# Patient Record
Sex: FEMALE | Race: BLACK OR AFRICAN AMERICAN | NOT HISPANIC OR LATINO | Employment: FULL TIME | URBAN - METROPOLITAN AREA
[De-identification: names, ages, dates, MRNs, and addresses within clinical notes are randomized per-mention and may not be internally consistent; named-entity substitution may affect disease eponyms.]

---

## 2017-02-06 LAB
INFLUENZA A ANTIGEN, POC: POSITIVE
INFLUENZA B ANTIGEN, POC: NEGATIVE

## 2017-02-06 PROCEDURE — 99284 EMERGENCY DEPT VISIT MOD MDM: CPT | Mod: 25

## 2017-02-06 PROCEDURE — 87804 INFLUENZA ASSAY W/OPTIC: CPT

## 2017-02-07 ENCOUNTER — HOSPITAL ENCOUNTER (EMERGENCY)
Facility: OTHER | Age: 32
Discharge: HOME | End: 2017-02-07
Attending: EMERGENCY MEDICINE
Payer: MEDICAID

## 2017-02-07 VITALS
WEIGHT: 260 LBS | TEMPERATURE: 99 F | SYSTOLIC BLOOD PRESSURE: 135 MMHG | OXYGEN SATURATION: 98 % | RESPIRATION RATE: 18 BRPM | HEART RATE: 85 BPM | DIASTOLIC BLOOD PRESSURE: 84 MMHG | BODY MASS INDEX: 43.32 KG/M2 | HEIGHT: 65 IN

## 2017-02-07 DIAGNOSIS — J10.1 INFLUENZA A: Primary | ICD-10-CM

## 2017-02-07 PROCEDURE — 25000003 PHARM REV CODE 250: Performed by: EMERGENCY MEDICINE

## 2017-02-07 RX ORDER — OSELTAMIVIR PHOSPHATE 75 MG/1
75 CAPSULE ORAL 2 TIMES DAILY
Qty: 10 CAPSULE | Refills: 0 | Status: SHIPPED | OUTPATIENT
Start: 2017-02-07 | End: 2017-02-12

## 2017-02-07 RX ORDER — IBUPROFEN 800 MG/1
800 TABLET ORAL EVERY 6 HOURS PRN
Qty: 20 TABLET | Refills: 0 | Status: SHIPPED | OUTPATIENT
Start: 2017-02-07 | End: 2018-06-22

## 2017-02-07 RX ORDER — CODEINE PHOSPHATE AND GUAIFENESIN 10; 100 MG/5ML; MG/5ML
5 SOLUTION ORAL EVERY 4 HOURS PRN
Qty: 473 ML | Refills: 0 | Status: SHIPPED | OUTPATIENT
Start: 2017-02-07 | End: 2017-02-17

## 2017-02-07 RX ORDER — IBUPROFEN 400 MG/1
800 TABLET ORAL
Status: COMPLETED | OUTPATIENT
Start: 2017-02-07 | End: 2017-02-07

## 2017-02-07 RX ADMIN — IBUPROFEN 800 MG: 400 TABLET ORAL at 12:02

## 2017-02-07 NOTE — DISCHARGE INSTRUCTIONS
Influenza     Viruses that cause influenza spread through the air in droplets when someone who has the flu coughs, sneezes, laughs, or talks.   Influenza (the flu) is an infection that affects your respiratory tract. This tract is made up of your mouth, nose, and lungs, and the passages between them. Unlike a cold, the flu can make you very ill. And it can lead to pneumonia, a serious lung infection. The flu can have serious complications and even be fatal for some people. These include older adults, young children, and people with certain chronic conditions.  Who is at risk for the flu?  Anyone can get the flu. But you are more likely to become infected if you:  · Have a weakened immune system  · Work in a healthcare setting where you may be exposed to flu germs  · Live or work with someone who has the flu  · Havent had an annual flu shot  How does the flu spread?  The flu is caused by viruses. The viruses spread through the air in droplets when someone who has the flu coughs, sneezes, laughs, or talks. You can become infected when you inhale these viruses directly. You can also become infected when you touch a surface on which the droplets have landed and then transfer the germs to your eyes, nose, or mouth. Touching used tissues, or sharing utensils, drinking glasses, or a toothbrush with an infected person can expose you to flu viruses, too.  What are the symptoms of the flu?  Flu symptoms tend to come on quickly and may last a few days to a few weeks. They include:  · Fever usually higher than 100.4°F  (38°C) and chills  · Sore throat and headache  · Dry cough  · Runny nose  · Tiredness and weakness  · Muscle aches  Things that make the flu worse  For some people, the flu can be very serious. The risk for complications is greater for:  · Children younger than age 5  · Adults ages 65 and older  · People with a chronic illness such as diabetes or heart, kidney, or lung disease  · People who live in a nursing  home or long-term care facility   How is the flu treated?  The flu usually gets better after 7 days or so. In some cases, your healthcare provider may prescribe an antiviral medicine. This may help you get well sooner. For the medicine to help, you need to take it as soon as possible (ideally within 48 hours) after your symptoms start. If you develop pneumonia or other serious illness, you may need to stay in the hospital.  Easing flu symptoms  · Drink lots of fluids such as water, juice, and warm soup. A good rule is to drink enough so that you urinate your normal amount.  · Get plenty of rest.  · Ask your healthcare provider what to take for fever and pain.  · Call your provider if your fever is 100.4°F (38°C) or higher, or you become dizzy, lightheaded, or short of breath.  Taking steps to protect others  · Wash your hands often, especially after coughing or sneezing. Or clean your hands with an alcohol-based hand  containing at least 60% alcohol.  · Cough or sneeze into a tissue. Then throw the tissue away and wash your hands. If you dont have a tissue, cough and sneeze into the crook of your elbow.  · Stay home until at least 24 hours after you no longer have a fever or chills. Be sure the fever isnt being hidden by fever-reducing medicine.  · Dont share food, utensils, drinking glasses, or a toothbrush with others.  · Ask your healthcare provider if others in your household should get antiviral medicine to help them avoid infection.  How can the flu be prevented?  · One of the best ways to avoid the flu is to get a flu vaccine each year. Viruses that cause the flu change from year to year. For that reason, doctors recommend getting the flu vaccine each year, as soon as it's available in your area. The vaccine may be given as a shot or as a nasal spray. Your healthcare provider can tell you which vaccine is right for you. The nasal spray is not recommended for the 0081-8025 flu season. The CDC says  this is because the nasal spray did not seem to protect against the flu over the last several flu seasons. In the past, it was meant for people ages 2 to 49.  · Wash your hands often. Frequent handwashing is a proven way to help prevent infection.  · Carry an alcohol-based hand gel containing at least 60% alcohol. Use it when you can't use soap and water. Then wash your hands as soon as you can.  · Avoid touching your eyes, nose, and mouth.  · At home and work, clean phones, computer keyboards, and toys often with disinfectant wipes.  · If possible, avoid close contact with others who have the flu or symptoms of the flu.  Handwashing tips  Handwashing is one of the best ways to prevent many common infections. If you are caring for or visiting someone with the flu, wash your hands each time you enter and leave the room. Follow these steps:  · Use warm water and plenty of soap. Rub your hands together well.  · Clean the whole hand, under your nails, between your fingers, and up the wrists.  · Wash for at least 15 seconds.  · Rinse, letting the water run down your fingers, not up your wrists.  · Dry your hands well. Use a paper towel to turn off the faucet and open the door.  Using alcohol-based hand   Alcohol-based hand  are also a good choice. Use them when you can't use soap and water. Follow these steps:  · Squeeze about a tablespoon of gel into the palm of one hand.  · Rub your hands together briskly, cleaning the backs of your hands, the palms, between your fingers, and up the wrists.  · Rub until the gel is gone and your hands are completely dry.  Preventing influenza in healthcare settings  The flu is a special concern for people in hospitals and long-term care facilities. To help prevent the spread of flu, many hospitals and nursing homes take these steps:  · Healthcare providers wash their hands or use an alcohol-based hand  before and after treating each patient.  · People with the flu  have private rooms and bathrooms or share a room with someone with the same infection.  · People at high-risk for the flu but don't have it are encouraged to get the flu and pneumonia vaccines.  · All healthcare workers are encouraged or required to get flu shots.   Date Last Reviewed: 8/27/2014  © 2099-0577 [employer]. [address] [place] [address], [place] [address]. All rights reserved. This information is not intended as a substitute for professional medical care. Always follow your healthcare professional's instructions.

## 2017-02-07 NOTE — ED PROVIDER NOTES
Encounter Date: 2017       History     Chief Complaint   Patient presents with    Generalized Body Aches     Pt has body aches, URI S&S x 1 day. no fever.      Review of patient's allergies indicates:  No Known Allergies  The history is provided by the patient.   31-year-old who complains of flulike symptoms.  Patient symptoms began 2 days ago.  Patient has been taking DayQuil for the symptoms with some improvement.  She reports generalized body aches which she rates as 7 out of 10.  She has also had a sore throat, nasal congestion and a cough productive of yellow sputum.  She denies shortness of breath or chest pain.  She reports headache.  Patient's daughter is ill with similar symptoms.  Patient is smoker   History reviewed. No pertinent past medical history.  No past medical history pertinent negatives.  Past Surgical History   Procedure Laterality Date     section, classic       x1     History reviewed. No pertinent family history.  Social History   Substance Use Topics    Smoking status: Never Smoker    Smokeless tobacco: None    Alcohol use No     Review of Systems   Constitutional: Negative for fever.   HENT: Positive for congestion and sore throat.    Eyes: Negative for pain.   Respiratory: Positive for cough. Negative for shortness of breath.    Cardiovascular: Negative for chest pain.   Gastrointestinal: Negative for abdominal pain.   Genitourinary: Negative for dysuria.   Musculoskeletal: Positive for back pain and myalgias. Negative for neck stiffness.   Skin: Negative for rash.   Neurological: Positive for headaches.       Physical Exam   Initial Vitals   BP Pulse Resp Temp SpO2   17 2252 17 2252 17 2252 17 2252 17 2252   138/81 92 20 98.6 °F (37 °C) 98 %     Physical Exam    Nursing note and vitals reviewed.  Constitutional: She appears well-developed and well-nourished.   HENT:   Head: Normocephalic and atraumatic.   Nose: Mucosal edema present.    Mouth/Throat: No oropharyngeal exudate.   Eyes: Conjunctivae and EOM are normal. Pupils are equal, round, and reactive to light.   Neck: Normal range of motion. Neck supple.   Cardiovascular: Normal rate and regular rhythm.   Pulmonary/Chest: Breath sounds normal.   Abdominal: Soft. There is no tenderness. There is no rebound and no guarding.   Musculoskeletal: Normal range of motion.   Neurological: She is alert and oriented to person, place, and time. She has normal strength.   Skin: Skin is warm and dry.   Psychiatric: She has a normal mood and affect.         ED Course   Procedures  Labs Reviewed   POCT INFLUENZA A/B   POCT RAPID INFLUENZA A/B             Medical Decision Making:   Initial Assessment:   31-year-old presents with flulike symptoms.  Patient is nontoxic appearing and is afebrile.    ED Management:  Patient has a positive influenza A.  She will be prescribed Tamiflu and guaifenesin with codeine.  Her daughter was also positive influenza A                   ED Course     Clinical Impression:   The encounter diagnosis was Influenza A.          Heena Del Real MD  02/07/17 0216

## 2017-02-07 NOTE — ED AVS SNAPSHOT
[facility] EMERGENCY DEPARTMENT  [address] [facility] [address]               Jacinda Hwang   2017 12:01 AM   ED    Description:  Female : 1985   Department:  [facility] Emergency Department           Your Care was Coordinated By:     Provider Role From To    Heena Del Real MD Attending Provider 17 [phone] --      Reason for Visit     Generalized Body Aches           Diagnoses this Visit        Comments    Influenza A    -  Primary       ED Disposition     None           To Do List           Follow-up Information     Follow up with Primary Doctor No.        Follow up with [facility] Emergency Department.    Specialty:  Emergency Medicine    Why:  If symptoms worsen    Contact information:    [address] [facility] [address]  [phone]       These Medications        Disp Refills Start End    ibuprofen (ADVIL,MOTRIN) 800 MG tablet 20 tablet 0 2017     Take 1 tablet (800 mg total) by mouth every 6 (six) hours as needed for Pain. - Oral    guaifenesin-codeine 100-10 mg/5 ml (TUSSI-ORGANIDIN NR)  mg/5 mL syrup 473 mL 0 2017    Take 5 mLs by mouth every 4 (four) hours as needed for Cough. - Oral    oseltamivir (TAMIFLU) 75 MG capsule 10 capsule 0 2017    Take 1 capsule (75 mg total) by mouth 2 (two) times daily. - Oral      [facility]     [facility] -  Assistance  Registered nurses in the [facility] provide clinical advisement, health education, appointment booking, and other advisory services.  Call for this free service at [phone].             Medications           Message regarding Medications     Verify the changes and/or additions to your medication regime listed below are the same as discussed with your clinician today.  If any of these changes or additions are incorrect, please notify your healthcare provider.        START taking these NEW medications        Refills     ibuprofen (ADVIL,MOTRIN) 800 MG tablet 0    Sig: Take 1 tablet (800 mg total) by mouth every 6 (six) hours as needed for Pain.    Class: Print    Route: Oral    guaifenesin-codeine 100-10 mg/5 ml (TUSSI-ORGANIDIN NR)  mg/5 mL syrup 0    Sig: Take 5 mLs by mouth every 4 (four) hours as needed for Cough.    Class: Print    Route: Oral    oseltamivir (TAMIFLU) 75 MG capsule 0    Sig: Take 1 capsule (75 mg total) by mouth 2 (two) times daily.    Class: Print    Route: Oral      These medications were administered today        Dose Freq    ibuprofen tablet 800 mg 800 mg ED 1 Time    Sig: Take 2 tablets (800 mg total) by mouth ED 1 Time.    Class: Normal    Route: Oral           Verify that the below list of medications is an accurate representation of the medications you are currently taking.  If none reported, the list may be blank. If incorrect, please contact your healthcare provider. Carry this list with you in case of emergency.           Current Medications     baclofen (LIORESAL) 10 MG tablet Take 10 mg by mouth 3 (three) times daily.    guaifenesin-codeine 100-10 mg/5 ml (TUSSI-ORGANIDIN NR)  mg/5 mL syrup Take 5 mLs by mouth every 4 (four) hours as needed for Cough.    hydrocodone-acetaminophen 5-325mg (NORCO) 5-325 mg per tablet Take 1 tablet by mouth every 4 (four) hours as needed for Pain.    ibuprofen (ADVIL,MOTRIN) 800 MG tablet Take 1 tablet (800 mg total) by mouth every 6 (six) hours as needed for Pain.    ibuprofen tablet 800 mg Take 2 tablets (800 mg total) by mouth ED 1 Time.    oseltamivir (TAMIFLU) 75 MG capsule Take 1 capsule (75 mg total) by mouth 2 (two) times daily.    tramadol-acetaminophen 37.5-325 mg (ULTRACET) 37.5-325 mg Tab Take 1 tablet by mouth every 4 (four) hours as needed for Pain.           Clinical Reference Information           Your Vitals Were     BP Pulse Temp Resp Height Weight    138/81 (BP Location: Left arm, Patient Position: Sitting) 92 98.6 °F (37 °C) (Temporal)  "20 5' 5" (1.651 m) 117.9 kg (260 lb)    Last Period SpO2 BMI          01/07/2017 98% 43.27 kg/m2        Allergies as of 2/7/2017     No Known Allergies      Immunizations Administered on Date of Encounter - 2/7/2017     None      ED Micro, Lab, POCT     Start Ordered       Status Ordering Provider    02/06/17 2317 02/06/17 2317  POCT Influenza A/B  Once      Final result     02/06/17 2303 02/06/17 2302  POCT Influenza A/B  Once      Completed       ED Imaging Orders     None        Discharge Instructions         Influenza     Viruses that cause influenza spread through the air in droplets when someone who has the flu coughs, sneezes, laughs, or talks.   Influenza (the flu) is an infection that affects your respiratory tract. This tract is made up of your mouth, nose, and lungs, and the passages between them. Unlike a cold, the flu can make you very ill. And it can lead to pneumonia, a serious lung infection. The flu can have serious complications and even be fatal for some people. These include older adults, young children, and people with certain chronic conditions.  Who is at risk for the flu?  Anyone can get the flu. But you are more likely to become infected if you:  · Have a weakened immune system  · Work in a healthcare setting where you may be exposed to flu germs  · Live or work with someone who has the flu  · Havent had an annual flu shot  How does the flu spread?  The flu is caused by viruses. The viruses spread through the air in droplets when someone who has the flu coughs, sneezes, laughs, or talks. You can become infected when you inhale these viruses directly. You can also become infected when you touch a surface on which the droplets have landed and then transfer the germs to your eyes, nose, or mouth. Touching used tissues, or sharing utensils, drinking glasses, or a toothbrush with an infected person can expose you to flu viruses, too.  What are the symptoms of the flu?  Flu symptoms tend to " come on quickly and may last a few days to a few weeks. They include:  · Fever usually higher than 100.4°F  (38°C) and chills  · Sore throat and headache  · Dry cough  · Runny nose  · Tiredness and weakness  · Muscle aches  Things that make the flu worse  For some people, the flu can be very serious. The risk for complications is greater for:  · Children younger than age 5  · Adults ages 65 and older  · People with a chronic illness such as diabetes or heart, kidney, or lung disease  · People who live in a nursing home or long-term care facility   How is the flu treated?  The flu usually gets better after 7 days or so. In some cases, your healthcare provider may prescribe an antiviral medicine. This may help you get well sooner. For the medicine to help, you need to take it as soon as possible (ideally within 48 hours) after your symptoms start. If you develop pneumonia or other serious illness, you may need to stay in the hospital.  Easing flu symptoms  · Drink lots of fluids such as water, juice, and warm soup. A good rule is to drink enough so that you urinate your normal amount.  · Get plenty of rest.  · Ask your healthcare provider what to take for fever and pain.  · Call your provider if your fever is 100.4°F (38°C) or higher, or you become dizzy, lightheaded, or short of breath.  Taking steps to protect others  · Wash your hands often, especially after coughing or sneezing. Or clean your hands with an alcohol-based hand  containing at least 60% alcohol.  · Cough or sneeze into a tissue. Then throw the tissue away and wash your hands. If you dont have a tissue, cough and sneeze into the crook of your elbow.  · Stay home until at least 24 hours after you no longer have a fever or chills. Be sure the fever isnt being hidden by fever-reducing medicine.  · Dont share food, utensils, drinking glasses, or a toothbrush with others.  · Ask your healthcare provider if others in your household should get  antiviral medicine to help them avoid infection.  How can the flu be prevented?  · One of the best ways to avoid the flu is to get a flu vaccine each year. Viruses that cause the flu change from year to year. For that reason, doctors recommend getting the flu vaccine each year, as soon as it's available in your area. The vaccine may be given as a shot or as a nasal spray. Your healthcare provider can tell you which vaccine is right for you. The nasal spray is not recommended for the 1902-0735 flu season. The CDC says this is because the nasal spray did not seem to protect against the flu over the last several flu seasons. In the past, it was meant for people ages 2 to 49.  · Wash your hands often. Frequent handwashing is a proven way to help prevent infection.  · Carry an alcohol-based hand gel containing at least 60% alcohol. Use it when you can't use soap and water. Then wash your hands as soon as you can.  · Avoid touching your eyes, nose, and mouth.  · At home and work, clean phones, computer keyboards, and toys often with disinfectant wipes.  · If possible, avoid close contact with others who have the flu or symptoms of the flu.  Handwashing tips  Handwashing is one of the best ways to prevent many common infections. If you are caring for or visiting someone with the flu, wash your hands each time you enter and leave the room. Follow these steps:  · Use warm water and plenty of soap. Rub your hands together well.  · Clean the whole hand, under your nails, between your fingers, and up the wrists.  · Wash for at least 15 seconds.  · Rinse, letting the water run down your fingers, not up your wrists.  · Dry your hands well. Use a paper towel to turn off the faucet and open the door.  Using alcohol-based hand   Alcohol-based hand  are also a good choice. Use them when you can't use soap and water. Follow these steps:  · Squeeze about a tablespoon of gel into the palm of one hand.  · Rub your hands  together briskly, cleaning the backs of your hands, the palms, between your fingers, and up the wrists.  · Rub until the gel is gone and your hands are completely dry.  Preventing influenza in healthcare settings  The flu is a special concern for people in hospitals and long-term care facilities. To help prevent the spread of flu, many hospitals and nursing homes take these steps:  · Healthcare providers wash their hands or use an alcohol-based hand  before and after treating each patient.  · People with the flu have private rooms and bathrooms or share a room with someone with the same infection.  · People at high-risk for the flu but don't have it are encouraged to get the flu and pneumonia vaccines.  · All healthcare workers are encouraged or required to get flu shots.   Date Last Reviewed: 8/27/2014  © 1382-0516 [employer]. [address] [place] [address]. All rights reserved. This information is not intended as a substitute for professional medical care. Always follow your healthcare professional's instructions.           [facility] Emergency Department complies with applicable Federal civil rights laws and does not discriminate on the basis of race, color, national origin, age, disability, or sex.        Language Assistance Services     ATTENTION: Language assistance services are available, free of charge. Please call [phone].      ATENCIÓN: Si kevin mitchell, tiene a romero disposición servicios gratuitos de asistencia lingüística. Llame al [phone].     CHÚ Ý: N?u b?n nói Ti?ng Vi?t, có các d?ch v? h? tr? ngôn ng? mi?n phí dành cho b?n. G?i s? [phone].

## 2017-03-20 ENCOUNTER — HOSPITAL ENCOUNTER (EMERGENCY)
Facility: OTHER | Age: 32
Discharge: HOME | End: 2017-03-20
Attending: EMERGENCY MEDICINE
Payer: MEDICAID

## 2017-03-20 VITALS
HEART RATE: 75 BPM | SYSTOLIC BLOOD PRESSURE: 149 MMHG | TEMPERATURE: 98 F | RESPIRATION RATE: 18 BRPM | WEIGHT: 240 LBS | DIASTOLIC BLOOD PRESSURE: 95 MMHG | OXYGEN SATURATION: 100 %

## 2017-03-20 DIAGNOSIS — G89.29 WRIST PAIN, CHRONIC, RIGHT: Primary | ICD-10-CM

## 2017-03-20 DIAGNOSIS — M25.531 WRIST PAIN, CHRONIC, RIGHT: Primary | ICD-10-CM

## 2017-03-20 LAB
B-HCG UR QL: NEGATIVE
CTP QC/QA: YES

## 2017-03-20 PROCEDURE — 29125 APPL SHORT ARM SPLINT STATIC: CPT | Mod: RT

## 2017-03-20 PROCEDURE — 99284 EMERGENCY DEPT VISIT MOD MDM: CPT | Mod: 25

## 2017-03-20 PROCEDURE — 81025 URINE PREGNANCY TEST: CPT

## 2017-03-20 PROCEDURE — 81025 URINE PREGNANCY TEST: CPT | Performed by: EMERGENCY MEDICINE

## 2017-03-20 RX ORDER — IBUPROFEN 600 MG/1
600 TABLET ORAL EVERY 8 HOURS PRN
Qty: 20 TABLET | Refills: 0 | Status: SHIPPED | OUTPATIENT
Start: 2017-03-20 | End: 2018-06-22 | Stop reason: ALTCHOICE

## 2017-03-20 NOTE — ED AVS SNAPSHOT
[facility] EMERGENCY DEPARTMENT  [address] [place] [address]               Jacinda Hwang   3/20/2017  9:46 AM   ED    Description:  Female : 1985   Department:  [facility] Emergency Department           Your Care was Coordinated By:     Provider Role From To    Yonathan Ambrose MD Attending Provider 17 0948 --      Reason for Visit     Hand Pain           Diagnoses this Visit        Comments    Wrist pain, chronic, right    -  Primary       ED Disposition     ED Disposition Condition Comment    Discharge  Jacinda Hwang discharge to home/self care.    - Condition at discharge: Stable  - Mode of Discharge: by walking out            To Do List           Follow-up Information     Schedule an appointment as soon as possible for a visit with Ezra Lopez PA-C.    Specialty:  Orthopedic Surgery    Contact information:    [address]  [address]  [place] [id number] [phone]         These Medications        Disp Refills Start End    ibuprofen (ADVIL,MOTRIN) 600 MG tablet 20 tablet 0 3/20/2017     Take 1 tablet (600 mg total) by mouth every 8 (eight) hours as needed for Pain. - Oral      [facility]     [facility] Care [facility] -  Assistance  Registered nurses in the [facility] provide clinical advisement, health education, appointment booking, and other advisory services.  Call for this free service at [phone].             Medications           Message regarding Medications     Verify the changes and/or additions to your medication regime listed below are the same as discussed with your clinician today.  If any of these changes or additions are incorrect, please notify your healthcare provider.        START taking these NEW medications        Refills    ibuprofen (ADVIL,MOTRIN) 600 MG tablet 0    Sig: Take 1 tablet (600 mg total) by mouth every 8 (eight) hours as needed for Pain.    Class: Print    Route: Oral           Verify that the  below list of medications is an accurate representation of the medications you are currently taking.  If none reported, the list may be blank. If incorrect, please contact your healthcare provider. Carry this list with you in case of emergency.           Current Medications     ibuprofen (ADVIL,MOTRIN) 600 MG tablet Take 1 tablet (600 mg total) by mouth every 8 (eight) hours as needed for Pain.           Clinical Reference Information           Your Vitals Were     BP Pulse Temp Resp Weight Last Period    149/95 75 98 °F (36.7 °C) 18 108.9 kg (240 lb) 03/09/2017 (Approximate)    SpO2                   100%           Allergies as of 3/20/2017     No Known Allergies      Immunizations Administered on Date of Encounter - 3/20/2017     None      ED Micro, Lab, POCT     Start Ordered       Status Ordering Provider    03/20/17 1003 03/20/17 1002  POCT urine pregnancy  Once      Final result       ED Imaging Orders     Start Ordered       Status Ordering Provider    03/20/17 0955 03/20/17 0955  X-Ray Wrist Complete Right  1 time imaging      Final result       Discharge References/Attachments     WRIST SPRAIN, UNDERSTANDING A (ENGLISH)      [facility] Sign-Up     Activating your [facility] account is as easy as 1-2-3!     1) Visit [email or web address], select Sign Up Now, enter this activation code and your date of birth, then select Next.  [email or web address]  Expires: 5/4/2017 11:05 AM      2) Create a username and password to use when you visit [facility] in the future and select a security question in case you lose your password and select Next.    3) Enter your e-mail address and click Sign Up!    Additional Information  If you have questions, please e-mail [email or web address] or call [phone] to talk to our [facility] staff. Remember, [facility] is NOT to be used for urgent needs. For medical emergencies, dial 911.          [facility] Emergency Department complies with applicable Federal civil rights laws and does not  discriminate on the basis of race, color, national origin, age, disability, or sex.        Language Assistance Services     ATTENTION: Language assistance services are available, free of charge. Please call [phone].      ATENCIÓN: Si kevin mitchell, tiene a romero disposición servicios gratuitos de asistencia lingüística. Llame al [phone].     CHÚ Ý: N?u b?n nói Ti?ng Vi?t, có các d?ch v? h? tr? ngôn ng? mi?n phí dành cho b?n. G?i s? [phone].

## 2017-03-27 NOTE — ED PROVIDER NOTES
Encounter Date: 3/20/2017       History     Chief Complaint   Patient presents with    Hand Pain     right hand pain, states goes up right arm     Review of patient's allergies indicates:  No Known Allergies  HPI Comments: Ms. Hwang reports pain in lateral aspect of her R wrist for months, no known trauma. Denies fevers, chils, numbness, tingling or weakness. Reports pain has been going up her R forearm intermittently in past weeks. No swelling. No other complaints.     The history is provided by the patient.     History reviewed. No pertinent past medical history.  Past Surgical History:   Procedure Laterality Date     SECTION, CLASSIC      x1     History reviewed. No pertinent family history.  Social History   Substance Use Topics    Smoking status: Never Smoker    Smokeless tobacco: None    Alcohol use No     Review of Systems   Constitutional: Negative.    Respiratory: Negative.    Cardiovascular: Negative.    Musculoskeletal:        Positive pain in R wrist, no swelling, numbness or tingling. More on lateral wrist. Mild pain with movement in R thumb. No decreased rom   All other systems reviewed and are negative.      Physical Exam   Initial Vitals   BP Pulse Resp Temp SpO2   17 0943 17 0943 17 0943 17 0943 17 0943   149/95 75 18 98 °F (36.7 °C) 100 %     Physical Exam    Nursing note and vitals reviewed.  Constitutional: She appears well-developed and well-nourished.   HENT:   Head: Normocephalic and atraumatic.   Cardiovascular: Normal rate, regular rhythm and normal heart sounds.   Musculoskeletal: Normal range of motion. She exhibits no edema.   2+radial pulse. Normal perfusion of RUE. No swelling or focal ttp. Pain in base of thumb and R lateral wrist predominantly. Normal passive and active rom, full.    Neurological: She is alert and oriented to person, place, and time. She has normal strength. No sensory deficit.   Skin: Skin is warm and dry. No rash noted. No  erythema.         ED Course   Procedures  Labs Reviewed   POCT URINE PREGNANCY             Medical Decision Making:   Clinical Tests:   Radiological Study: Ordered and Reviewed  ED Management:  Ms Hwang is stable for d/c. Wrist splint, pcp and ortho f//u prn. quesitons answered. ddx includes UCL sprain, chronic scaphoid fx, unknown other wrist sprain.            Imaging Results         X-Ray Wrist Complete Right (Final result) Result time:  03/20/17 10:39:36    Final result by Interface, Rad Results In (03/20/17 10:39:36)    Narrative:    Study Desc:   XR WRIST COMPLETE 3 VIEWS RIGHT  Clinical History: Atraumatic right wrist pain on the radial aspect     Comparison: None     FINDINGS:     The 3 views of the wrist show no fractures or dislocations.     There is normal alignment of the carpal bones, with normal scapholunate, lunotriquetral   and capitate-lunate alignment. The carpal bone alignment arcs appear unremarkable.     The radiocarpal joint is unremarkable. The distal radial ulnar joint is unremarkable. The   carpometacarpal joints are unremarkable. There is no soft tissue swelling or joint   effusion. There are no radio-opaque foreign bodies.     If there is further concern, followup radiographs or MRI of the wrist may be performed   for complete assessment.     IMPRESSION:  No fractures or dislocations of the right wrist.     SL: 24 Signed by: Altagracia Zarate MD  2017-03-20 10:39:35                       ED Course     Clinical Impression:   The encounter diagnosis was Wrist pain, chronic, right.          Yonathan Ambrose MD  03/27/17 6684

## 2017-07-15 ENCOUNTER — HOSPITAL ENCOUNTER (EMERGENCY)
Facility: OTHER | Age: 32
Discharge: HOME | End: 2017-07-16
Attending: INTERNAL MEDICINE
Payer: MEDICAID

## 2017-07-15 VITALS
HEIGHT: 66 IN | RESPIRATION RATE: 17 BRPM | SYSTOLIC BLOOD PRESSURE: 139 MMHG | HEART RATE: 86 BPM | DIASTOLIC BLOOD PRESSURE: 93 MMHG | BODY MASS INDEX: 42.75 KG/M2 | WEIGHT: 266 LBS | OXYGEN SATURATION: 98 % | TEMPERATURE: 98 F

## 2017-07-15 DIAGNOSIS — L03.90 CELLULITIS, UNSPECIFIED CELLULITIS SITE: Primary | ICD-10-CM

## 2017-07-15 PROCEDURE — 99283 EMERGENCY DEPT VISIT LOW MDM: CPT

## 2017-07-16 PROBLEM — L03.90 CELLULITIS: Status: ACTIVE | Noted: 2017-07-16

## 2017-07-16 RX ORDER — SULFAMETHOXAZOLE AND TRIMETHOPRIM 800; 160 MG/1; MG/1
1 TABLET ORAL 2 TIMES DAILY
Qty: 20 TABLET | Refills: 0 | Status: SHIPPED | OUTPATIENT
Start: 2017-07-16 | End: 2017-07-26

## 2017-07-16 NOTE — ED TRIAGE NOTES
Pt presents to ER with c/o pain, redness and swelling to rt upper inner arm, right below shoulder.  It has defined border but she denies any bite, or injury.

## 2017-07-16 NOTE — ED PROVIDER NOTES
Encounter Date: 7/15/2017       History     Chief Complaint   Patient presents with    Rash     redness noted to right upper arm x a few hours, denies any injury, no drainage noted     31-year-old female presents to the emergency department with right upper arm rash times one day.  Patient states rashes pruritic and slightly tender.      The history is provided by the patient. No  was used.   Rash    This is a new problem. The current episode started today. The problem has been unchanged. The problem is associated with an unknown factor. Affected Location: Right upper arm. The pain is at a severity of 2/10. The pain has been fluctuating since onset. Associated symptoms include itching and pain. Pertinent negatives include no blisters and no weeping.     Review of patient's allergies indicates:  No Known Allergies  No past medical history on file.  Past Surgical History:   Procedure Laterality Date     SECTION, CLASSIC      x1     No family history on file.  Social History   Substance Use Topics    Smoking status: Never Smoker    Smokeless tobacco: Not on file    Alcohol use No     Review of Systems   Skin: Positive for itching and rash.   All other systems reviewed and are negative.      Physical Exam     Initial Vitals   BP Pulse Resp Temp SpO2   07/15/17 2247 07/15/17 2244 07/15/17 2244 07/15/17 2244 07/15/17 2244   (!) 139/93 86 17 98 °F (36.7 °C) 98 %      MAP       07/15/17 2247       108.33         Physical Exam    Nursing note and vitals reviewed.  Constitutional: No distress.   Obese   HENT:   Head: Normocephalic and atraumatic.   Eyes: EOM are normal.   Neck: Normal range of motion.   Cardiovascular: Normal rate and regular rhythm.   Pulmonary/Chest: No respiratory distress.   Abdominal: Soft.   Musculoskeletal: Normal range of motion.   Neurological: She is alert. She has normal strength.   Skin: Skin is warm and dry.   Right medial upper arm erythema in the region of  stretch marks with slight induration, slight tenderness to palpation, no fluctuance.  Approximately 5 cm diameter   Psychiatric: She has a normal mood and affect.         ED Course   Procedures  Labs Reviewed - No data to display          Medical Decision Making:   Initial Assessment:   31-year-old female presents to the emergency department with right upper arm rash times one day.  Patient states rashes pruritic and slightly tender.  Differential Diagnosis:   Dermatitis  ALLERGIC urticaria  Cellulitis  ED Management:  Patient was given instructions for cellulitis and a prescription for Bactrim DS.  She was advised to follow with her primary care physician within a week for reevaluation.                   ED Course     Clinical Impression:   The primary diagnosis is cellulitis of right upper extremity  Disposition:   Disposition: Discharged  Condition: Stable                        Aakash Hwang MD  07/16/17 0005

## 2018-03-25 ENCOUNTER — HOSPITAL ENCOUNTER (EMERGENCY)
Facility: OTHER | Age: 33
Discharge: HOME | End: 2018-03-25
Attending: EMERGENCY MEDICINE
Payer: MEDICAID

## 2018-03-25 VITALS
HEART RATE: 85 BPM | RESPIRATION RATE: 16 BRPM | TEMPERATURE: 98 F | SYSTOLIC BLOOD PRESSURE: 148 MMHG | OXYGEN SATURATION: 100 % | DIASTOLIC BLOOD PRESSURE: 96 MMHG

## 2018-03-25 DIAGNOSIS — R10.32 LLQ PAIN: ICD-10-CM

## 2018-03-25 DIAGNOSIS — N83.202 LEFT OVARIAN CYST: Primary | ICD-10-CM

## 2018-03-25 LAB
B-HCG UR QL: NEGATIVE
BILIRUBIN, POC UA: NEGATIVE
BLOOD, POC UA: ABNORMAL
CLARITY, POC UA: CLEAR
COLOR, POC UA: YELLOW
CTP QC/QA: YES
GLUCOSE, POC UA: NEGATIVE
KETONES, POC UA: NEGATIVE
LEUKOCYTE EST, POC UA: NEGATIVE
NITRITE, POC UA: NEGATIVE
PH UR STRIP: 6 [PH]
PROTEIN, POC UA: NEGATIVE
SPECIFIC GRAVITY, POC UA: 1.02
UROBILINOGEN, POC UA: 0.2 E.U./DL

## 2018-03-25 PROCEDURE — 81025 URINE PREGNANCY TEST: CPT | Performed by: EMERGENCY MEDICINE

## 2018-03-25 PROCEDURE — 81003 URINALYSIS AUTO W/O SCOPE: CPT

## 2018-03-25 PROCEDURE — 99284 EMERGENCY DEPT VISIT MOD MDM: CPT

## 2018-03-25 RX ORDER — TRAMADOL HYDROCHLORIDE 50 MG/1
50 TABLET ORAL EVERY 6 HOURS PRN
Qty: 12 TABLET | Refills: 0 | Status: SHIPPED | OUTPATIENT
Start: 2018-03-25 | End: 2018-04-04

## 2018-03-25 RX ORDER — KETOROLAC TROMETHAMINE 10 MG/1
10 TABLET, FILM COATED ORAL EVERY 6 HOURS
Qty: 20 TABLET | Refills: 0 | OUTPATIENT
Start: 2018-03-25 | End: 2018-10-14

## 2018-03-26 NOTE — ED PROVIDER NOTES
Encounter Date: 3/25/2018       History     Chief Complaint   Patient presents with    Abdominal Pain     patient reports having lower left sided abdominal/flank pain since last Monday.  patient reports seeing a small amout of blood with urination this morning    Flank Pain    Dysuria     patient reports prior to urinating she feels pressure     Chief complaint: Left side pain  32-year-old complains of pain to her left lower quadrant that has been ongoing for over a week.  The patient has been taking over-the-counter medicines without improvement.  The pain is been intermittent and worsens with movement.  Patient noted blood in her urine today.  She denies nausea, vomiting, diarrhea.  No fever.  No trauma.  Patient denies vaginal discharge or bleeding.  She rates her pain as 8 out of 10       The history is provided by the patient.     Review of patient's allergies indicates:  No Known Allergies  History reviewed. No pertinent past medical history.  Past Surgical History:   Procedure Laterality Date     SECTION, CLASSIC      x1     History reviewed. No pertinent family history.  Social History   Substance Use Topics    Smoking status: Never Smoker    Smokeless tobacco: Never Used    Alcohol use No     Review of Systems   Constitutional: Negative for fever.   HENT: Negative for sore throat.    Respiratory: Negative for shortness of breath.    Cardiovascular: Negative for chest pain.   Gastrointestinal: Positive for abdominal pain. Negative for nausea.   Genitourinary: Positive for flank pain and hematuria. Negative for dysuria.   Musculoskeletal: Negative for back pain.   Skin: Negative for rash.   Neurological: Negative for weakness.       Physical Exam     Initial Vitals [18 1020]   BP Pulse Resp Temp SpO2   (!) 141/93 84 18 98.6 °F (37 °C) 98 %      MAP       109         Physical Exam    Nursing note and vitals reviewed.  Constitutional: She appears well-developed and well-nourished.   HENT:    Head: Normocephalic and atraumatic.   Eyes: Conjunctivae and EOM are normal. Pupils are equal, round, and reactive to light.   Neck: Normal range of motion. Neck supple.   Cardiovascular: Normal rate and regular rhythm.   Pulmonary/Chest: Breath sounds normal.   Abdominal: Soft. There is tenderness in the left lower quadrant. There is no rebound and no guarding.   Musculoskeletal: Normal range of motion.   Neurological: She is alert and oriented to person, place, and time. She has normal strength.   Skin: Skin is warm and dry.   Psychiatric: She has a normal mood and affect.         ED Course   Procedures  Labs Reviewed   POCT URINALYSIS W/O SCOPE - Abnormal; Notable for the following:        Result Value    Glucose, UA Negative (*)     Bilirubin, UA Negative (*)     Ketones, UA Negative (*)     Blood, UA 1+ (*)     Protein, UA Negative (*)     Nitrite, UA Negative (*)     Leukocytes, UA Negative (*)     All other components within normal limits   POCT URINE PREGNANCY   POCT URINALYSIS W/O SCOPE             Medical Decision Making:   Initial Assessment:   32-year-old who complains of pain to her left lower quadrant her left flank.  On exam patient does have tenderness to this area and appears uncomfortable  ED Management:  Urinalysis does show blood in the urine.  CT shows evidence of a cystic structure in the left lower quadrant.  Ultrasound was recommended.  This did show a left complex ovarian cyst.  Patient was advised follow-up with her OB/GYN doctor.  She'll be discharged on Toradol and Ultram                      Clinical Impression:   The primary encounter diagnosis was Left ovarian cyst. A diagnosis of LLQ pain was also pertinent to this visit.                           Heena Del Real MD  03/25/18 1911

## 2018-06-22 ENCOUNTER — HOSPITAL ENCOUNTER (EMERGENCY)
Facility: HOSPITAL | Age: 33
Discharge: HOME | End: 2018-06-22
Payer: MEDICAID

## 2018-06-22 VITALS
DIASTOLIC BLOOD PRESSURE: 91 MMHG | WEIGHT: 270 LBS | SYSTOLIC BLOOD PRESSURE: 132 MMHG | HEIGHT: 66 IN | HEART RATE: 85 BPM | OXYGEN SATURATION: 98 % | RESPIRATION RATE: 17 BRPM | TEMPERATURE: 99 F | BODY MASS INDEX: 43.39 KG/M2

## 2018-06-22 DIAGNOSIS — H66.92 ACUTE OTITIS MEDIA, LEFT: Primary | ICD-10-CM

## 2018-06-22 DIAGNOSIS — K08.89 PAIN, DENTAL: ICD-10-CM

## 2018-06-22 LAB
B-HCG UR QL: NEGATIVE
CTP QC/QA: YES
POCT GLUCOSE: 100 MG/DL (ref 70–110)

## 2018-06-22 PROCEDURE — 25000003 PHARM REV CODE 250: Performed by: NURSE PRACTITIONER

## 2018-06-22 PROCEDURE — 81025 URINE PREGNANCY TEST: CPT | Performed by: NURSE PRACTITIONER

## 2018-06-22 PROCEDURE — 99283 EMERGENCY DEPT VISIT LOW MDM: CPT

## 2018-06-22 RX ORDER — IBUPROFEN 600 MG/1
600 TABLET ORAL ONCE
Status: COMPLETED | OUTPATIENT
Start: 2018-06-22 | End: 2018-06-22

## 2018-06-22 RX ORDER — IBUPROFEN 800 MG/1
800 TABLET ORAL EVERY 8 HOURS PRN
Qty: 30 TABLET | Refills: 0 | OUTPATIENT
Start: 2018-06-22 | End: 2022-01-08

## 2018-06-22 RX ORDER — AMOXICILLIN 875 MG/1
875 TABLET, FILM COATED ORAL 2 TIMES DAILY
Qty: 20 TABLET | Refills: 0 | Status: SHIPPED | OUTPATIENT
Start: 2018-06-22 | End: 2018-07-02

## 2018-06-22 RX ADMIN — IBUPROFEN 600 MG: 600 TABLET ORAL at 02:06

## 2018-06-23 NOTE — ED PROVIDER NOTES
Encounter Date: 2018       History     Chief Complaint   Patient presents with    Dental Pain     pt reports L sided dental pain x 3 days that now extends down side of neck     A 32-year-old female who presents to the ED with complaints of left dental pain at extends into her neck.  Patient states the symptoms started 3 days ago.  Patient has a history of DM and hypertension.  Patient denies fever chills nasal congestion or coughing.          Review of patient's allergies indicates:  No Known Allergies  Past Medical History:   Diagnosis Date    Diabetes mellitus     Hypertension      Past Surgical History:   Procedure Laterality Date     SECTION, CLASSIC      x1     History reviewed. No pertinent family history.  Social History   Substance Use Topics    Smoking status: Never Smoker    Smokeless tobacco: Never Used    Alcohol use No     Review of Systems   Constitutional: Negative.  Negative for chills and fever.   HENT: Positive for dental problem. Negative for congestion.    Eyes: Negative.    Respiratory: Negative.  Negative for chest tightness and shortness of breath.    Cardiovascular: Negative.  Negative for chest pain.   Gastrointestinal: Negative.  Negative for abdominal pain and vomiting.   Endocrine: Negative.    Genitourinary: Negative.  Negative for dysuria and hematuria.   Musculoskeletal: Negative.  Negative for back pain and neck pain.   Skin: Negative.  Negative for rash.   Allergic/Immunologic: Negative for immunocompromised state.   Neurological: Negative.  Negative for weakness.   Hematological: Does not bruise/bleed easily.   Psychiatric/Behavioral: Negative.    All other systems reviewed and are negative.      Physical Exam     Initial Vitals [18 1339]   BP Pulse Resp Temp SpO2   (!) 132/91 85 17 99 °F (37.2 °C) 98 %      MAP       --         Physical Exam    Nursing note and vitals reviewed.  Constitutional: Vital signs are normal. She appears well-developed. She is  cooperative. She does not appear ill.   HENT:   Head: Normocephalic and atraumatic.   Right Ear: Tympanic membrane and external ear normal.   Left Ear: External ear normal. Tympanic membrane is erythematous.   Nose: Nose normal.   Mouth/Throat: Uvula is midline, oropharynx is clear and moist and mucous membranes are normal.       Eyes: Conjunctivae and lids are normal. Pupils are equal, round, and reactive to light.   Neck: Normal range of motion. Neck supple.   Cardiovascular: Normal rate, regular rhythm, S1 normal, S2 normal and normal heart sounds.   Pulmonary/Chest: Effort normal and breath sounds normal.   Abdominal: Soft. Normal appearance and bowel sounds are normal. There is no tenderness.   Musculoskeletal: Normal range of motion.   From of all extremities   Lymphadenopathy:     She has cervical adenopathy.        Left cervical: Superficial cervical adenopathy present.   Neurological: She is alert and oriented to person, place, and time.   Skin: Skin is warm, dry and intact.   Psychiatric: She has a normal mood and affect. Her speech is normal. Thought content normal.         ED Course   Procedures  Labs Reviewed   POCT URINE PREGNANCY   POCT GLUCOSE          Imaging Results    None          Medical Decision Making:   Initial Assessment:   A 32-year-old female who presents to the ED with complaints of left dental pain for 3 days which extends into her neck.  Patient denies fever chills nasal congestion or cough.  Differential Diagnosis:   Dental pain  Otitis media  Otitis externa  Pharyngitis   ED Management:  Physical exam.  Discharge home with amoxicillin and Motrin.  Follow-up with PCP in 2-3 days.  Return ED for worsening of symptoms.                      Clinical Impression:   The primary encounter diagnosis was Acute otitis media, left. A diagnosis of Pain, dental was also pertinent to this visit.                             DAYO Workman  06/22/18 2221       DAYO Workman  06/22/18 2222

## 2018-10-14 ENCOUNTER — HOSPITAL ENCOUNTER (EMERGENCY)
Facility: HOSPITAL | Age: 33
Discharge: HOME | End: 2018-10-14
Attending: INTERNAL MEDICINE
Payer: MEDICAID

## 2018-10-14 VITALS
WEIGHT: 256 LBS | HEIGHT: 66 IN | HEART RATE: 79 BPM | TEMPERATURE: 99 F | OXYGEN SATURATION: 99 % | BODY MASS INDEX: 41.14 KG/M2 | DIASTOLIC BLOOD PRESSURE: 75 MMHG | SYSTOLIC BLOOD PRESSURE: 127 MMHG | RESPIRATION RATE: 18 BRPM

## 2018-10-14 DIAGNOSIS — R10.9 LEFT FLANK PAIN: Primary | ICD-10-CM

## 2018-10-14 DIAGNOSIS — R31.9 HEMATURIA, UNSPECIFIED TYPE: ICD-10-CM

## 2018-10-14 LAB
B-HCG UR QL: NEGATIVE
BILIRUBIN, POC UA: NEGATIVE
BLOOD, POC UA: ABNORMAL
CLARITY, POC UA: CLEAR
COLOR, POC UA: ABNORMAL
CTP QC/QA: YES
GLUCOSE, POC UA: NEGATIVE
KETONES, POC UA: ABNORMAL
LEUKOCYTE EST, POC UA: NEGATIVE
NITRITE, POC UA: NEGATIVE
PH UR STRIP: 5.5 [PH]
PROTEIN, POC UA: NEGATIVE
SPECIFIC GRAVITY, POC UA: >=1.03
UROBILINOGEN, POC UA: 1 E.U./DL

## 2018-10-14 PROCEDURE — 81025 URINE PREGNANCY TEST: CPT | Performed by: INTERNAL MEDICINE

## 2018-10-14 PROCEDURE — 96372 THER/PROPH/DIAG INJ SC/IM: CPT

## 2018-10-14 PROCEDURE — 63600175 PHARM REV CODE 636 W HCPCS: Performed by: INTERNAL MEDICINE

## 2018-10-14 PROCEDURE — 99284 EMERGENCY DEPT VISIT MOD MDM: CPT | Mod: 25

## 2018-10-14 PROCEDURE — 81003 URINALYSIS AUTO W/O SCOPE: CPT

## 2018-10-14 RX ORDER — KETOROLAC TROMETHAMINE 30 MG/ML
60 INJECTION, SOLUTION INTRAMUSCULAR; INTRAVENOUS
Status: COMPLETED | OUTPATIENT
Start: 2018-10-14 | End: 2018-10-14

## 2018-10-14 RX ORDER — KETOROLAC TROMETHAMINE 10 MG/1
10 TABLET, FILM COATED ORAL 3 TIMES DAILY PRN
Qty: 10 TABLET | Refills: 0 | Status: SHIPPED | OUTPATIENT
Start: 2018-10-14 | End: 2022-01-08

## 2018-10-14 RX ADMIN — KETOROLAC TROMETHAMINE 60 MG: 30 INJECTION, SOLUTION INTRAMUSCULAR at 01:10

## 2018-10-14 NOTE — ED NOTES
Pt presents with c/o LLQ pain; pt reports increased pain while voiding; pt also reports L, flank pain, urgency, hesitancy, dark colored urine and malodorous urine x2 days; denies fever; Hx of UTI; no resp distress; resp E/U; pt on RA; NADN: will continue to monitor

## 2018-10-14 NOTE — ED PROVIDER NOTES
Encounter Date: 10/14/2018       History     Chief Complaint   Patient presents with    Flank Pain     pt c/o L flank pain and urinary urgency x 2 days, pt states she has taken AZO with no relief      32-year-old female presents to the emergency department complaining of left flank pain x 2 days.  She denies fever/chills, nausea/vomiting.  She also denies urinary frequency, urgency and dysuria.      The history is provided by the patient. No  was used.     Review of patient's allergies indicates:  No Known Allergies  Past Medical History:   Diagnosis Date    Diabetes mellitus     Hypertension      Past Surgical History:   Procedure Laterality Date     SECTION, CLASSIC      x1     No family history on file.  Social History     Tobacco Use    Smoking status: Never Smoker    Smokeless tobacco: Never Used   Substance Use Topics    Alcohol use: No    Drug use: No     Review of Systems   Genitourinary: Positive for flank pain. Negative for frequency and urgency.   All other systems reviewed and are negative.      Physical Exam     Initial Vitals [10/14/18 0107]   BP Pulse Resp Temp SpO2   (!) 154/87 76 18 99 °F (37.2 °C) 99 %      MAP       --         Physical Exam    Nursing note and vitals reviewed.  Constitutional: She appears well-developed and well-nourished. No distress.   HENT:   Head: Normocephalic and atraumatic.   Right Ear: External ear normal.   Left Ear: External ear normal.   Eyes: EOM are normal.   Neck: Normal range of motion.   Cardiovascular: Normal rate and regular rhythm.   Pulmonary/Chest: Breath sounds normal. No respiratory distress.   Abdominal: Soft. Bowel sounds are normal. She exhibits no distension.   Musculoskeletal:   No CVA tenderness. Left flank and lower back pain upon movement   Neurological: She is alert. She has normal strength.   Skin: Skin is warm.   Psychiatric: She has a normal mood and affect.         ED Course   Procedures  Labs Reviewed   POCT  URINALYSIS W/O SCOPE - Abnormal; Notable for the following components:       Result Value    Glucose, UA Negative (*)     Bilirubin, UA Negative (*)     Ketones, UA Trace (*)     Spec Grav UA >=1.030 (*)     Blood, UA 2+ (*)     Protein, UA Negative (*)     Nitrite, UA Negative (*)     Leukocytes, UA Negative (*)     All other components within normal limits   POCT URINE PREGNANCY   POCT URINALYSIS W/O SCOPE          Imaging Results          CT Renal Stone Study ABD Pelvis WO (Final result)  Result time 10/14/18 02:03:48    Final result by Alex Baptiste MD (10/14/18 02:03:48)                 Impression:      No renal or ureteral calculi.  No hydroureteronephrosis.    Stable 3 cm calcified fibroid on the left.    Interval decrease in size of left adnexal cystic mass, now measuring up to 4.4 cm.      Electronically signed by: Alex Baptiste MD  Date:    10/14/2018  Time:    02:03             Narrative:    EXAMINATION:  CT RENAL STONE STUDY ABD PELVIS WO    CLINICAL HISTORY:  Flank pain, stone disease suspected;Hematuria;    TECHNIQUE:  Low dose axial images, sagittal and coronal reformations were obtained from the lung bases to the pubic symphysis.  Contrast was not administered.    COMPARISON:  March 25, 2018.    FINDINGS:  Heart: Normal in size. No pericardial effusion.    Lung Bases: Well aerated, without consolidation or pleural fluid.    Liver: Normal in size and attenuation, with no focal hepatic lesions.    Gallbladder: Contracted.    Bile Ducts: No evidence of dilated ducts.    Pancreas: No mass or peripancreatic fat stranding.    Spleen: Unremarkable.    Adrenals: Unremarkable.    Kidneys/ Ureters: No renal or ureteral calculi.  No hydroureteronephrosis.    Bladder: Empty.    Reproductive organs: Stable 3 cm calcified fibroid on the left.  Interval decrease in size of left adnexal cystic mass, now measuring up to 4.4 cm.    GI Tract/Mesentery: No evidence of bowel obstruction or  inflammation.    Peritoneal Space: No ascites. No free air.    Retroperitoneum: No significant adenopathy.    Abdominal wall: Unremarkable.    Vasculature: No significant atherosclerosis or aneurysm.    Bones: No acute fracture.                                 Medical Decision Making:   Initial Assessment:   32-year-old female presents to the emergency department complaining of left flank pain x 2 days.  She denies fever/chills, nausea/vomiting.  She also denies urinary frequency, urgency and dysuria.  Clinical Tests:   Lab Tests: Ordered and Reviewed  Radiological Study: Ordered and Reviewed  ED Management:  Urinalysis reveals hematuria and elevated specific gravity.  Patient was counseled on the need to increase oral water intake.  CT of the abdomen and pelvis with renal stone protocol was obtained and reveals no urinary tract stone.  Patient was given instructions for hematuria and flank pain.  A dose of Toradol was given in the emergency department the patient was given a prescription for Toradol.  She was advised to follow up with her primary care physician within next 2 days for re-evaluation and to return to the emergency department if condition worsens.                      Clinical Impression:   The primary encounter diagnosis was Left flank pain. A diagnosis of Hematuria, unspecified type was also pertinent to this visit.      Disposition:   Disposition: Discharged  Condition: Stable                        Aakash Hwang MD  10/14/18 0218

## 2019-01-02 ENCOUNTER — HOSPITAL ENCOUNTER (EMERGENCY)
Facility: HOSPITAL | Age: 34
Discharge: HOME | End: 2019-01-02
Attending: EMERGENCY MEDICINE
Payer: MEDICAID

## 2019-01-02 VITALS
OXYGEN SATURATION: 97 % | WEIGHT: 250 LBS | HEART RATE: 83 BPM | BODY MASS INDEX: 40.35 KG/M2 | RESPIRATION RATE: 18 BRPM | TEMPERATURE: 98 F | DIASTOLIC BLOOD PRESSURE: 85 MMHG | SYSTOLIC BLOOD PRESSURE: 129 MMHG

## 2019-01-02 DIAGNOSIS — N30.01 ACUTE CYSTITIS WITH HEMATURIA: Primary | ICD-10-CM

## 2019-01-02 LAB
B-HCG UR QL: NEGATIVE
BILIRUBIN, POC UA: NEGATIVE
BLOOD, POC UA: ABNORMAL
CLARITY, POC UA: CLEAR
COLOR, POC UA: YELLOW
CTP QC/QA: YES
GLUCOSE, POC UA: NEGATIVE
KETONES, POC UA: NEGATIVE
LEUKOCYTE EST, POC UA: NEGATIVE
NITRITE, POC UA: NEGATIVE
PH UR STRIP: 6.5 [PH]
PROTEIN, POC UA: NEGATIVE
SPECIFIC GRAVITY, POC UA: 1.01
UROBILINOGEN, POC UA: 1 E.U./DL

## 2019-01-02 PROCEDURE — 99284 EMERGENCY DEPT VISIT MOD MDM: CPT | Mod: 25,ER

## 2019-01-02 PROCEDURE — 87086 URINE CULTURE/COLONY COUNT: CPT

## 2019-01-02 PROCEDURE — 81003 URINALYSIS AUTO W/O SCOPE: CPT | Mod: ER

## 2019-01-02 PROCEDURE — 81025 URINE PREGNANCY TEST: CPT | Mod: ER | Performed by: NURSE PRACTITIONER

## 2019-01-02 RX ORDER — FLUCONAZOLE 200 MG/1
TABLET ORAL
Qty: 2 TABLET | Refills: 0 | Status: SHIPPED | OUTPATIENT
Start: 2019-01-02 | End: 2022-01-08

## 2019-01-02 RX ORDER — SULFAMETHOXAZOLE AND TRIMETHOPRIM 800; 160 MG/1; MG/1
1 TABLET ORAL EVERY 12 HOURS
Qty: 14 TABLET | Refills: 0 | Status: SHIPPED | OUTPATIENT
Start: 2019-01-02 | End: 2019-01-09

## 2019-01-02 NOTE — ED PROVIDER NOTES
Encounter Date: 2019       History     Chief Complaint   Patient presents with    painful urination     patient reports having painful urination and lower area abdominal pain X2 days     The history is provided by the patient. No  was used.   Dysuria    This is a new problem. The current episode started two days ago. The problem occurs every urination. The problem has been waxing and waning. The quality of the pain is described as burning. The pain is at a severity of 4/10. She is sexually active (Reports she only has sex with her fiance). There is no history of pyelonephritis. Associated symptoms include urgency. Pertinent negatives include no nausea, no vomiting and no hematuria. Associated symptoms comments: Denies discharge. She has tried nothing for the symptoms. Her past medical history does not include kidney stones, single kidney, urological procedure, recurrent UTIs, urinary stasis or catheterization.     Review of patient's allergies indicates:  No Known Allergies  Past Medical History:   Diagnosis Date    Diabetes mellitus     Hypertension      Past Surgical History:   Procedure Laterality Date     SECTION, CLASSIC      x1     History reviewed. No pertinent family history.  Social History     Tobacco Use    Smoking status: Never Smoker    Smokeless tobacco: Never Used   Substance Use Topics    Alcohol use: No    Drug use: No     Review of Systems   Constitutional: Negative.  Negative for appetite change and fever.   HENT: Negative.  Negative for congestion, dental problem, ear discharge, hearing loss, mouth sores, rhinorrhea and trouble swallowing.    Eyes: Negative.  Negative for pain and discharge.   Respiratory: Negative.  Negative for shortness of breath.    Cardiovascular: Negative.  Negative for chest pain.   Gastrointestinal: Negative.  Negative for abdominal distention, abdominal pain, constipation, diarrhea, nausea, rectal pain and vomiting.   Endocrine:  Negative.    Genitourinary: Positive for dysuria and urgency. Negative for dyspareunia, hematuria, vaginal bleeding, vaginal discharge and vaginal pain.   Musculoskeletal: Negative for back pain and neck pain.   Skin: Negative.  Negative for rash.   Allergic/Immunologic: Negative.    Neurological: Negative.  Negative for facial asymmetry, speech difficulty and light-headedness.   Hematological: Negative.    Psychiatric/Behavioral: Negative.  Negative for agitation, dysphoric mood and sleep disturbance.   All other systems reviewed and are negative.      Physical Exam     Initial Vitals [01/02/19 1230]   BP Pulse Resp Temp SpO2   129/85 83 18 98.2 °F (36.8 °C) 97 %      MAP       --         Physical Exam    Nursing note and vitals reviewed.  Constitutional: She appears well-developed and well-nourished. She is not diaphoretic.  Non-toxic appearance. She does not appear ill. No distress.   HENT:   Head: Normocephalic and atraumatic.   Eyes: Conjunctivae are normal. Right eye exhibits no discharge. Left eye exhibits no discharge.   Neck: Normal range of motion.   Cardiovascular: Normal rate, regular rhythm, normal heart sounds and intact distal pulses. Exam reveals no gallop and no friction rub.    No murmur heard.  Pulmonary/Chest: Breath sounds normal. No respiratory distress. She has no wheezes. She has no rhonchi. She has no rales. She exhibits no tenderness.   Abdominal: Soft. Bowel sounds are normal. She exhibits no distension and no mass. There is no tenderness. There is no rebound and no guarding.   Musculoskeletal: Normal range of motion.   Neurological: She is alert and oriented to person, place, and time.   Skin: Skin is warm and dry. Capillary refill takes less than 2 seconds. No rash noted.   Psychiatric: She has a normal mood and affect. Her behavior is normal. Judgment and thought content normal.         ED Course   Procedures  Labs Reviewed   POCT URINALYSIS W/O SCOPE - Abnormal; Notable for the  following components:       Result Value    Glucose, UA Negative (*)     Bilirubin, UA Negative (*)     Ketones, UA Negative (*)     Blood, UA 1+ (*)     Protein, UA Negative (*)     Nitrite, UA Negative (*)     Leukocytes, UA Negative (*)     All other components within normal limits   CULTURE, URINE   POCT URINE PREGNANCY   POCT URINALYSIS W/O SCOPE   POCT GLUCOSE, HAND-HELD DEVICE          Imaging Results    None          Medical Decision Making:   Initial Assessment:   UTI  Differential Diagnosis:   Abdominal pain, CVA tenderness  Clinical Tests:   Lab Tests: Ordered and Reviewed  The following lab test(s) were unremarkable: UPT and Urinalysis       <> Summary of Lab: UPT negative; urine culture pending  ED Management:  Patient be discharged home on Bactrim.  Patient is instructed to drink 6 8 glasses of water daily, follow up with her primary care provider on tomorrow return to the ER as needed if symptoms worsen or fail to improve.  Patient also instructed to refrain from sex x1 week.  Patient verbalized understanding of discharge instructions and treatment plan.                      Clinical Impression:   The encounter diagnosis was Acute cystitis with hematuria.                             Toussaint Battley III, DAYO  01/02/19 2088

## 2019-01-04 LAB — BACTERIA UR CULT: NORMAL

## 2019-03-21 ENCOUNTER — HOSPITAL ENCOUNTER (EMERGENCY)
Facility: HOSPITAL | Age: 34
Discharge: HOME | End: 2019-03-21
Attending: EMERGENCY MEDICINE
Payer: MEDICAID

## 2019-03-21 VITALS
RESPIRATION RATE: 20 BRPM | HEART RATE: 85 BPM | SYSTOLIC BLOOD PRESSURE: 150 MMHG | DIASTOLIC BLOOD PRESSURE: 94 MMHG | OXYGEN SATURATION: 98 % | TEMPERATURE: 99 F | WEIGHT: 250 LBS | HEIGHT: 66 IN | BODY MASS INDEX: 40.18 KG/M2

## 2019-03-21 DIAGNOSIS — M54.9 ACUTE LEFT-SIDED BACK PAIN, UNSPECIFIED BACK LOCATION: Primary | ICD-10-CM

## 2019-03-21 DIAGNOSIS — N76.0 VAGINOSIS: ICD-10-CM

## 2019-03-21 LAB
B-HCG UR QL: NEGATIVE
BILIRUBIN, POC UA: NEGATIVE
BLOOD, POC UA: ABNORMAL
CLARITY, POC UA: ABNORMAL
COLOR, POC UA: YELLOW
CTP QC/QA: YES
GLUCOSE, POC UA: NEGATIVE
KETONES, POC UA: ABNORMAL
LEUKOCYTE EST, POC UA: NEGATIVE
NITRITE, POC UA: NEGATIVE
PH UR STRIP: 8.5 [PH]
POCT GLUCOSE: 81 MG/DL (ref 70–110)
PROTEIN, POC UA: ABNORMAL
SPECIFIC GRAVITY, POC UA: 1.02
UROBILINOGEN, POC UA: 1 E.U./DL

## 2019-03-21 PROCEDURE — 81025 URINE PREGNANCY TEST: CPT | Mod: ER | Performed by: NURSE PRACTITIONER

## 2019-03-21 PROCEDURE — 81003 URINALYSIS AUTO W/O SCOPE: CPT | Mod: ER

## 2019-03-21 PROCEDURE — 25000003 PHARM REV CODE 250: Mod: ER | Performed by: NURSE PRACTITIONER

## 2019-03-21 PROCEDURE — 99284 EMERGENCY DEPT VISIT MOD MDM: CPT | Mod: ER

## 2019-03-21 RX ORDER — CYCLOBENZAPRINE HCL 10 MG
10 TABLET ORAL 3 TIMES DAILY PRN
Qty: 15 TABLET | Refills: 0 | Status: SHIPPED | OUTPATIENT
Start: 2019-03-21 | End: 2019-03-26

## 2019-03-21 RX ORDER — FLUCONAZOLE 150 MG/1
150 TABLET ORAL
Status: COMPLETED | OUTPATIENT
Start: 2019-03-21 | End: 2019-03-21

## 2019-03-21 RX ORDER — NAPROXEN 500 MG/1
500 TABLET ORAL 2 TIMES DAILY WITH MEALS
Qty: 60 TABLET | Refills: 0 | Status: SHIPPED | OUTPATIENT
Start: 2019-03-21 | End: 2022-01-08

## 2019-03-21 RX ORDER — METRONIDAZOLE 500 MG/1
500 TABLET ORAL 3 TIMES DAILY
Qty: 21 TABLET | Refills: 0 | Status: SHIPPED | OUTPATIENT
Start: 2019-03-21 | End: 2019-03-28

## 2019-03-21 RX ADMIN — FLUCONAZOLE 150 MG: 150 TABLET ORAL at 06:03

## 2019-03-21 NOTE — ED PROVIDER NOTES
Encounter Date: 3/21/2019       History   No chief complaint on file.    Patient presents to ER with possible UTI.  States she is having some lower back pain and has a vaginal discharge that started today.  Patient ambulated in the ER.  Patient denies any nausea, chills, fever, chest pain, shortness of breath. Patient appears in no distress at this time.  Patient's last menstrual period was .  Patient denies any unprotected sex.  States that she thinks she might have a yeast infection or bacterial vaginosis and would just like to be treated for that.          Review of patient's allergies indicates:  No Known Allergies  Past Medical History:   Diagnosis Date    Diabetes mellitus     Hypertension      Past Surgical History:   Procedure Laterality Date     SECTION, CLASSIC      x1     No family history on file.  Social History     Tobacco Use    Smoking status: Never Smoker    Smokeless tobacco: Never Used   Substance Use Topics    Alcohol use: No    Drug use: No     Review of Systems   Genitourinary: Positive for flank pain and vaginal discharge.   All other systems reviewed and are negative.      Physical Exam     Initial Vitals   BP Pulse Resp Temp SpO2   -- -- -- -- --      MAP       --         Physical Exam    Nursing note and vitals reviewed.  Constitutional: Vital signs are normal. She appears well-developed and well-nourished. She is not diaphoretic. She is cooperative.   HENT:   Head: Normocephalic and atraumatic.   Right Ear: External ear normal.   Left Ear: External ear normal.   Nose: Nose normal.   Mouth/Throat: Oropharynx is clear and moist.   Eyes: Conjunctivae, EOM and lids are normal. Pupils are equal, round, and reactive to light. Right eye exhibits no discharge. No scleral icterus.   Neck: Trachea normal, normal range of motion and full passive range of motion without pain. Neck supple. No thyromegaly present. No tracheal deviation and normal range of motion present. No neck  rigidity. No Brudzinski's sign noted. No JVD present.   Cardiovascular: Normal rate, regular rhythm, normal heart sounds, intact distal pulses and normal pulses. Exam reveals no gallop and no friction rub.    No murmur heard.  Pulmonary/Chest: Effort normal and breath sounds normal. No stridor. No tachypnea. No respiratory distress. She has no wheezes. She has no rhonchi. She has no rales. She exhibits no tenderness.   Abdominal: Soft. Normal appearance and bowel sounds are normal. She exhibits no distension and no mass. There is no tenderness. There is no rebound and no guarding.   Musculoskeletal: Normal range of motion. She exhibits no edema or tenderness.   Low back pain that is worse with movement.  No spinal tenderness or step-offs noted   Lymphadenopathy:     She has no cervical adenopathy.     She has no axillary adenopathy.   Neurological: She is alert and oriented to person, place, and time. She has normal strength and normal reflexes.   Skin: Skin is warm, dry and intact. Capillary refill takes less than 2 seconds. No rash noted. No erythema.   Psychiatric: She has a normal mood and affect. Her speech is normal and behavior is normal. Judgment and thought content normal. Cognition and memory are normal.         ED Course   Procedures  Labs Reviewed   POCT URINALYSIS W/O SCOPE   POCT URINE PREGNANCY          Imaging Results    None          Medical Decision Making:   Differential Diagnosis:   UTI, pelvic inflammatory disease, yeast infection                      Clinical Impression:       ICD-10-CM ICD-9-CM   1. Acute left-sided back pain, unspecified back location M54.9 724.5   2. Vaginosis N76.0 616.10                                Annemarie Carlisle, [facility]  03/21/19 5497

## 2019-04-10 ENCOUNTER — HOSPITAL ENCOUNTER (EMERGENCY)
Facility: HOSPITAL | Age: 34
Discharge: HOME | End: 2019-04-10
Attending: EMERGENCY MEDICINE

## 2019-04-10 VITALS
BODY MASS INDEX: 41.14 KG/M2 | WEIGHT: 256 LBS | OXYGEN SATURATION: 100 % | DIASTOLIC BLOOD PRESSURE: 86 MMHG | RESPIRATION RATE: 18 BRPM | TEMPERATURE: 98 F | SYSTOLIC BLOOD PRESSURE: 150 MMHG | HEART RATE: 72 BPM | HEIGHT: 66 IN

## 2019-04-10 DIAGNOSIS — M25.519 SHOULDER PAIN: ICD-10-CM

## 2019-04-10 DIAGNOSIS — M62.838 TRAPEZIUS MUSCLE SPASM: Primary | ICD-10-CM

## 2019-04-10 PROCEDURE — 93005 ELECTROCARDIOGRAM TRACING: CPT | Mod: ER

## 2019-04-10 PROCEDURE — 93010 EKG 12-LEAD: ICD-10-PCS | Mod: ,,, | Performed by: INTERNAL MEDICINE

## 2019-04-10 PROCEDURE — 99284 EMERGENCY DEPT VISIT MOD MDM: CPT | Mod: ER

## 2019-04-10 PROCEDURE — 93010 ELECTROCARDIOGRAM REPORT: CPT | Mod: ,,, | Performed by: INTERNAL MEDICINE

## 2019-04-10 RX ORDER — METHOCARBAMOL 500 MG/1
1000 TABLET, FILM COATED ORAL 3 TIMES DAILY
Qty: 15 TABLET | Refills: 0 | Status: SHIPPED | OUTPATIENT
Start: 2019-04-10 | End: 2019-04-15

## 2019-04-10 RX ORDER — ETODOLAC 400 MG/1
400 TABLET, FILM COATED ORAL 2 TIMES DAILY
Qty: 20 TABLET | Refills: 0 | Status: SHIPPED | OUTPATIENT
Start: 2019-04-10 | End: 2022-01-08

## 2019-04-10 RX ORDER — AMLODIPINE BESYLATE 10 MG/1
10 TABLET ORAL DAILY
COMMUNITY
End: 2022-01-08

## 2019-04-10 RX ORDER — METFORMIN HYDROCHLORIDE 500 MG/1
500 TABLET ORAL 2 TIMES DAILY WITH MEALS
COMMUNITY
End: 2022-01-08

## 2019-04-11 NOTE — ED PROVIDER NOTES
"Encounter Date: 4/10/2019    SCRIBE #1 NOTE: I, Naga Lazo, am scribing for, and in the presence of, Dr. Kwon.       History     Chief Complaint   Patient presents with    Shoulder Pain     right shoulder pain that radiates to the right jaw which started 45 mins PTA     This is a 33 y.o. female who presents to the ED complaining of right shoulder pain that radiates down to right chest and neck for 1 day. Moving her right arm exacerbates her pain. She reports a ball in throat sensation. Denies coughing.       The history is provided by the patient. No  was used.     Review of patient's allergies indicates:  No Known Allergies  Past Medical History:   Diagnosis Date    Diabetes mellitus     Hypertension      Past Surgical History:   Procedure Laterality Date     SECTION, CLASSIC      x1    TUBAL LIGATION       History reviewed. No pertinent family history.  Social History     Tobacco Use    Smoking status: Never Smoker    Smokeless tobacco: Never Used   Substance Use Topics    Alcohol use: Yes     Alcohol/week: 0.6 oz     Types: 1 Glasses of wine per week     Comment: social    Drug use: No     Review of Systems   Constitutional: Negative.  Negative for fever.   HENT: Negative for sore throat.         Positive for "ball" in throat   Eyes: Negative.    Respiratory: Negative.  Negative for shortness of breath.    Cardiovascular: Negative.  Negative for chest pain.   Gastrointestinal: Negative.  Negative for nausea and vomiting.   Endocrine: Negative.    Genitourinary: Negative.  Negative for dysuria.   Musculoskeletal: Positive for myalgias (  rt shoulder).   Skin: Negative.  Negative for rash.   Allergic/Immunologic: Negative.    Neurological: Negative.  Negative for headaches.   Hematological: Negative.  Negative for adenopathy.   Psychiatric/Behavioral: Negative.  Negative for behavioral problems.   All other systems reviewed and are negative.      Physical Exam     Initial " Vitals [04/10/19 1941]   BP Pulse Resp Temp SpO2   (!) 176/106 83 (!) 21 98 °F (36.7 °C) 99 %      MAP       --         Physical Exam    Nursing note and vitals reviewed.  Constitutional: She appears well-developed and well-nourished.   HENT:   Head: Normocephalic and atraumatic.   Right Ear: External ear normal.   Left Ear: External ear normal.   Nose: Nose normal.   Eyes: Conjunctivae are normal.   Neck: Trachea normal. Neck supple. Muscular tenderness present. No spinous process tenderness present. Decreased range of motion present. No edema and no erythema present. No neck rigidity. No Brudzinski's sign and no Kernig's sign noted.       Cardiovascular: Normal rate and intact distal pulses.   Pulmonary/Chest: Effort normal and breath sounds normal. No respiratory distress. She has no wheezes. She has no rhonchi. She has no rales.   Abdominal: Soft. There is no tenderness.   Musculoskeletal:        Right shoulder: She exhibits decreased range of motion, tenderness, pain and spasm.        Arms:  All three trapezius muscles are in spasm  ROM related pain to all three trapezius heads.    Neurological: She is alert and oriented to person, place, and time.   Skin: Skin is warm and dry. Capillary refill takes less than 2 seconds.   Psychiatric: She has a normal mood and affect. Her behavior is normal.         ED Course   Procedures  Labs Reviewed - No data to display  EKG Readings: (Independently Interpreted)   Rhythm: Normal Sinus Rhythm. Heart Rate: 76. Ectopy: No Ectopy. Conduction: 1st Degree AV Block. ST Segments: Normal ST Segments. T Waves: Normal. Axis: Normal. Clinical Impression: Normal Sinus Rhythm       Imaging Results    None             Additional MDM:   PERC Rule:   Age is greater than or equal to 50 = 0.0  Heart Rate is greater than or equal to 100 = 0.0  SaO2 on room air < 95% = 0.0  Unilateral leg swelling = 0.0  Hemoptysis = 0.0  Recent surgery or trauma = 0.0  Prior PE or DVT =  0.0  Hormone use =  0.00  PERC Score = 0    Well's Criteria Score:  -Clinical symptoms of DVT (leg swelling, pain with palpation) = 0.0  -Other diagnosis less likely than pulmonary embolism =            0.0  -Heart Rate >100 =   0.0  -Immobilization (= or > than 3 days) or surgery in the previous 4 weeks = 0.0  -Previous DVT/PE = 0.0  -Hemoptysis =          0.0  -Malignancy =           0.0  Well's Probability Score =    0      Heart Score:    History:          Slightly suspicious.  ECG:             Normal  Age:               Less than 45 years  Risk factors: no risk factors known    ALISIA Score:   Age over 65:                                    0.00   > or = to 3 CAD risk factors:           0.00  Established CAD:                            0.00  > or = to 2 anginal events in the past 24 hours: 0.00  Use of ASA in past 7 days:              0.00  Elevated Enzymes:                         ___  ST Depression > or = to 0.05 mV:  0.00         Scribe Attestation:   Scribe #1: I performed the above scribed service and the documentation accurately describes the services I performed. I attest to the accuracy of the note.    This document was produced by a scribe under my direction and in my presence. I agree with the content of the note and have made any necessary edits.     Aaron Kwon MD    04/10/2019 8:18 PM           Clinical Impression:       ICD-10-CM ICD-9-CM   1. Trapezius muscle spasm M62.838 728.85   2. Shoulder pain M25.519 719.41         Disposition:   Disposition: Discharged  Condition: Stable                        Aaron Kwon MD  04/10/19 2018

## 2019-10-22 ENCOUNTER — HOSPITAL ENCOUNTER (EMERGENCY)
Facility: HOSPITAL | Age: 34
Discharge: HOME | End: 2019-10-22
Attending: EMERGENCY MEDICINE

## 2019-10-22 VITALS
OXYGEN SATURATION: 97 % | HEART RATE: 101 BPM | HEIGHT: 65 IN | WEIGHT: 250 LBS | TEMPERATURE: 98 F | RESPIRATION RATE: 18 BRPM | SYSTOLIC BLOOD PRESSURE: 158 MMHG | DIASTOLIC BLOOD PRESSURE: 93 MMHG | BODY MASS INDEX: 41.65 KG/M2

## 2019-10-22 DIAGNOSIS — N83.202 CYST OF LEFT OVARY: Primary | ICD-10-CM

## 2019-10-22 LAB
ALBUMIN SERPL-MCNC: 3.3 G/DL (ref 3.3–5.5)
ALP SERPL-CCNC: 81 U/L (ref 42–141)
B-HCG UR QL: NEGATIVE
BILIRUB SERPL-MCNC: 0.4 MG/DL (ref 0.2–1.6)
BILIRUBIN, POC UA: NEGATIVE
BLOOD, POC UA: ABNORMAL
BUN SERPL-MCNC: 11 MG/DL (ref 7–22)
CALCIUM SERPL-MCNC: 9.1 MG/DL (ref 8–10.3)
CHLORIDE SERPL-SCNC: 106 MMOL/L (ref 98–108)
CLARITY, POC UA: ABNORMAL
COLOR, POC UA: YELLOW
CREAT SERPL-MCNC: 0.8 MG/DL (ref 0.6–1.2)
CTP QC/QA: YES
GLUCOSE SERPL-MCNC: 162 MG/DL (ref 73–118)
GLUCOSE, POC UA: NEGATIVE
KETONES, POC UA: NEGATIVE
LEUKOCYTE EST, POC UA: NEGATIVE
NITRITE, POC UA: NEGATIVE
PH UR STRIP: 6 [PH]
POC ALT (SGPT): 17 U/L (ref 10–47)
POC AST (SGOT): 19 U/L (ref 11–38)
POC TCO2: 26 MMOL/L (ref 18–33)
POCT GLUCOSE: 150 MG/DL (ref 70–110)
POTASSIUM BLD-SCNC: 3.6 MMOL/L (ref 3.6–5.1)
PROTEIN, POC UA: ABNORMAL
PROTEIN, POC: 8 G/DL (ref 6.4–8.1)
SODIUM BLD-SCNC: 140 MMOL/L (ref 128–145)
SPECIFIC GRAVITY, POC UA: >=1.03
UROBILINOGEN, POC UA: 0.2 E.U./DL

## 2019-10-22 PROCEDURE — 81025 URINE PREGNANCY TEST: CPT | Mod: ER | Performed by: EMERGENCY MEDICINE

## 2019-10-22 PROCEDURE — 85025 COMPLETE CBC W/AUTO DIFF WBC: CPT | Mod: ER

## 2019-10-22 PROCEDURE — 96374 THER/PROPH/DIAG INJ IV PUSH: CPT | Mod: ER

## 2019-10-22 PROCEDURE — 80053 COMPREHEN METABOLIC PANEL: CPT | Mod: ER

## 2019-10-22 PROCEDURE — 81003 URINALYSIS AUTO W/O SCOPE: CPT | Mod: ER

## 2019-10-22 PROCEDURE — 99284 EMERGENCY DEPT VISIT MOD MDM: CPT | Mod: 25,ER

## 2019-10-22 PROCEDURE — 63600175 PHARM REV CODE 636 W HCPCS: Mod: ER | Performed by: EMERGENCY MEDICINE

## 2019-10-22 RX ORDER — KETOROLAC TROMETHAMINE 30 MG/ML
15 INJECTION, SOLUTION INTRAMUSCULAR; INTRAVENOUS
Status: COMPLETED | OUTPATIENT
Start: 2019-10-22 | End: 2019-10-22

## 2019-10-22 RX ORDER — TRAMADOL HYDROCHLORIDE 50 MG/1
50 TABLET ORAL EVERY 6 HOURS PRN
Qty: 12 TABLET | Refills: 0 | Status: SHIPPED | OUTPATIENT
Start: 2019-10-22 | End: 2019-11-01

## 2019-10-22 RX ADMIN — KETOROLAC TROMETHAMINE 15 MG: 30 INJECTION, SOLUTION INTRAMUSCULAR at 03:10

## 2019-10-22 NOTE — ED PROVIDER NOTES
Encounter Date: 10/22/2019       History     Chief Complaint   Patient presents with    Back Pain     PT REPORTS SUDDEN ONSET OF LBP RADIATING TO BILAT LOWER ABD AND RADIATING TO BILATERAL GROIN SINCE 2100    Abdominal Pain     This patient presents to the emergency department complaints of left lower quadrant abdominal pain that radiates straight into her back.  Patient denies any prior symptomatology consistent with this.  She denies a cough or fever flu-like symptoms nausea vomiting diarrhea constipation.    The history is provided by the patient.     Review of patient's allergies indicates:  No Known Allergies  Past Medical History:   Diagnosis Date    Diabetes mellitus     Hypertension      Past Surgical History:   Procedure Laterality Date     SECTION, CLASSIC      x1    TUBAL LIGATION       No family history on file.  Social History     Tobacco Use    Smoking status: Never Smoker    Smokeless tobacco: Never Used   Substance Use Topics    Alcohol use: Yes     Alcohol/week: 1.0 standard drinks     Types: 1 Glasses of wine per week     Comment: social    Drug use: No     Review of Systems   Constitutional: Negative.    HENT: Negative.    Eyes: Negative.    Respiratory: Negative.    Cardiovascular: Negative.    Gastrointestinal: Negative.    Endocrine: Negative.    Genitourinary: Positive for pelvic pain.   Musculoskeletal: Positive for back pain.   Skin: Negative.    Allergic/Immunologic: Negative.    Neurological: Negative.    Hematological: Negative.    Psychiatric/Behavioral: Negative.    All other systems reviewed and are negative.      Physical Exam     Initial Vitals [10/22/19 0204]   BP Pulse Resp Temp SpO2   (!) 186/115 (!) 111 (!) 22 98.7 °F (37.1 °C) 98 %      MAP       --         Physical Exam    Nursing note and vitals reviewed.  Constitutional: Vital signs are normal. She is Obese . She is active and cooperative.   HENT:   Head: Normocephalic and atraumatic.   Eyes: Conjunctivae,  EOM and lids are normal. Pupils are equal, round, and reactive to light.   Neck: Trachea normal and full passive range of motion without pain. Neck supple. No thyroid mass present.   Cardiovascular: Normal rate, regular rhythm, S1 normal, S2 normal, normal heart sounds, intact distal pulses and normal pulses.   Pulmonary/Chest: Effort normal and breath sounds normal.   Abdominal: Soft. Normal appearance, normal aorta and bowel sounds are normal. There is no tenderness. There is no rigidity, no rebound, no guarding, no CVA tenderness, no tenderness at McBurney's point and negative Garvin's sign.       Musculoskeletal: Normal range of motion.   Lymphadenopathy:     She has no axillary adenopathy.   Neurological: She is alert and oriented to person, place, and time.   Skin: Skin is warm, dry and intact.   Psychiatric: She has a normal mood and affect. Her speech is normal and behavior is normal. Judgment and thought content normal. Cognition and memory are normal.         ED Course   Procedures  Labs Reviewed   POCT URINALYSIS W/O SCOPE - Abnormal; Notable for the following components:       Result Value    Spec Grav UA >=1.030 (*)     Blood, UA 1+ (*)     Protein, UA 3+ (*)     All other components within normal limits   POCT GLUCOSE - Abnormal; Notable for the following components:    POCT Glucose 150 (*)     All other components within normal limits   POCT CMP - Abnormal; Notable for the following components:    POC Glucose 162 (*)     All other components within normal limits   POCT URINE PREGNANCY   POCT URINALYSIS W/O SCOPE   POCT CBC   POCT GLUCOSE, HAND-HELD DEVICE   POCT CMP          Imaging Results           CT Renal Stone Study ABD Pelvis WO (Final result)  Result time 10/22/19 03:11:04    Final result by Sabino Butcher MD (10/22/19 03:11:04)                 Impression:      Complex cystic left adnexal mass lesion has increased in size when compared to the prior examination, clinical and historical  correlation and follow-up is recommended.    Suspected uterine fibroid noted.    Mild free fluid of the pelvis noted.    There is no evidence for ureteral calculus or obstructive uropathy bilaterally.    This report was flagged in Epic as abnormal.      Electronically signed by: Sabino Butcher  Date:    10/22/2019  Time:    03:11             Narrative:    EXAMINATION:  CT RENAL STONE STUDY ABD PELVIS WO    CLINICAL HISTORY:  Flank pain, stone disease suspected;    TECHNIQUE:  Low dose axial images, sagittal and coronal reformations were obtained from the lung bases to the pubic symphysis.  Contrast was not administered.    COMPARISON:  October 14, 2018    FINDINGS:  CT examination of the abdomen and pelvis was performed via renal stone study protocol.  Intravenous contrast and oral contrast was not utilized as per protocol.    The kidneys demonstrate no evidence for hydronephrosis or obstructive uropathy or perinephric inflammatory change bilaterally.  The ureters are difficult to delineate in their entirety along their course to the urinary bladder however there is no evidence for hydroureter, ureteral calculus or obstructive uropathy bilaterally.  The urinary bladder appears unremarkable for degree of distention, incompletely distended.    The lung bases demonstrate mild atelectatic change.  When accounting for limitations of the exam there is no evidence for acute process of the stomach, liver, gallbladder, pancreas, spleen, or adrenal glands.  The abdominal aorta appears normal in caliber, otherwise not well evaluated on this exam.  There is no evidence for small bowel obstructive process.  The appendix is identified and does not appear inflamed.  There is no inflammatory or obstructive process of the colon.    There is mild free fluid in the lower pelvis, there is a concentrically calcified lesion of the uterus likely a fibroid appearing similar to the prior study.  There is a complex mass lesion at the left  adnexa, this has a predominant cystic component, measuring approximately 11 Hounsfield units.  This lesion measures up to approximately 5.6 x 6.2 cm on axial imaging, maximal dimension on coronal reconstruction imaging is approximately 8 cm.  Note is made of a mass lesion of the left adnexa on the prior study however this appears to have increased in size and appears somewhat more complex.  Clinical and historical correlation is needed, follow-up is recommended.    There is no evidence for free intraperitoneal air.  The osseous structures appear intact                                 Medical Decision Making:   ED Management:  CT evaluation of this patient reveals a complex ovarian mass look back in the patient's medical record and she has been evaluated for for this and had an ultrasound done which revealed the patient had an ovarian cyst.  Patient has no peritoneal signs although she does have a slight elevation white blood cell count I do not believe this is any infectious etiology but more consistent with her prior diagnosis of ovarian cyst.  The patient will be followed as an outpatient and receive oral pain medications.                      Clinical Impression:       ICD-10-CM ICD-9-CM   1. Cyst of left ovary N83.202 620.2                                Aaron Kwon MD  10/22/19 0340

## 2019-10-24 NOTE — ADDENDUM NOTE
Encounter addended by: Melina Terry on: 10/24/2019 10:49 AM   Actions taken: Charge Capture section accepted, Visit Navigator Flowsheet section accepted

## 2020-04-10 ENCOUNTER — HOSPITAL ENCOUNTER (EMERGENCY)
Facility: HOSPITAL | Age: 35
Discharge: HOME | End: 2020-04-10
Attending: EMERGENCY MEDICINE
Payer: MEDICAID

## 2020-04-10 VITALS
HEIGHT: 65 IN | TEMPERATURE: 98 F | WEIGHT: 265 LBS | DIASTOLIC BLOOD PRESSURE: 91 MMHG | HEART RATE: 80 BPM | OXYGEN SATURATION: 99 % | SYSTOLIC BLOOD PRESSURE: 180 MMHG | RESPIRATION RATE: 16 BRPM | BODY MASS INDEX: 44.15 KG/M2

## 2020-04-10 DIAGNOSIS — R07.9 CHEST PAIN, UNSPECIFIED TYPE: Primary | ICD-10-CM

## 2020-04-10 DIAGNOSIS — F41.8 ANXIETY WITH LIMITED-SYMPTOM ATTACKS: ICD-10-CM

## 2020-04-10 DIAGNOSIS — R07.9 CHEST PAIN: ICD-10-CM

## 2020-04-10 LAB
ALBUMIN SERPL-MCNC: 3.7 G/DL (ref 3.3–5.5)
ALP SERPL-CCNC: 72 U/L (ref 42–141)
B-HCG UR QL: NEGATIVE
BILIRUB SERPL-MCNC: 0.4 MG/DL (ref 0.2–1.6)
BUN SERPL-MCNC: 11 MG/DL (ref 7–22)
CALCIUM SERPL-MCNC: 9.2 MG/DL (ref 8–10.3)
CHLORIDE SERPL-SCNC: 110 MMOL/L (ref 98–108)
CREAT SERPL-MCNC: 0.7 MG/DL (ref 0.6–1.2)
CTP QC/QA: YES
GLUCOSE SERPL-MCNC: 140 MG/DL (ref 73–118)
POC ALT (SGPT): 21 U/L (ref 10–47)
POC AST (SGOT): 23 U/L (ref 11–38)
POC B-TYPE NATRIURETIC PEPTIDE: <5 PG/ML (ref 0–100)
POC CARDIAC TROPONIN I: 0 NG/ML
POC TCO2: 24 MMOL/L (ref 18–33)
POTASSIUM BLD-SCNC: 4.3 MMOL/L (ref 3.6–5.1)
PROTEIN, POC: 8.3 G/DL (ref 6.4–8.1)
SAMPLE: NORMAL
SODIUM BLD-SCNC: 140 MMOL/L (ref 128–145)

## 2020-04-10 PROCEDURE — 83880 ASSAY OF NATRIURETIC PEPTIDE: CPT | Mod: ER

## 2020-04-10 PROCEDURE — 99284 EMERGENCY DEPT VISIT MOD MDM: CPT | Mod: 25,ER

## 2020-04-10 PROCEDURE — 85025 COMPLETE CBC W/AUTO DIFF WBC: CPT | Mod: ER

## 2020-04-10 PROCEDURE — 84484 ASSAY OF TROPONIN QUANT: CPT | Mod: ER

## 2020-04-10 PROCEDURE — 81025 URINE PREGNANCY TEST: CPT | Mod: ER | Performed by: EMERGENCY MEDICINE

## 2020-04-10 PROCEDURE — 93005 ELECTROCARDIOGRAM TRACING: CPT | Mod: ER

## 2020-04-10 PROCEDURE — 36000 PLACE NEEDLE IN VEIN: CPT | Mod: ER

## 2020-04-10 PROCEDURE — 80053 COMPREHEN METABOLIC PANEL: CPT | Mod: ER

## 2020-04-10 RX ORDER — NAPROXEN SODIUM 220 MG/1
81 TABLET, FILM COATED ORAL DAILY
Qty: 30 TABLET | Refills: 0 | Status: SHIPPED | OUTPATIENT
Start: 2020-04-10 | End: 2022-01-08

## 2020-04-10 RX ORDER — HYDROXYZINE PAMOATE 50 MG/1
50 CAPSULE ORAL NIGHTLY PRN
Qty: 20 CAPSULE | Refills: 0 | Status: SHIPPED | OUTPATIENT
Start: 2020-04-10 | End: 2022-01-08

## 2020-04-10 NOTE — ED PROVIDER NOTES
Encounter Date: 4/10/2020       History     Chief Complaint   Patient presents with    Chest Pain     LEFT SIDED CHEST PAIN W SOB;  RADIATES TO LEFT ARM W NUMBNESS; STARTED YESTERDAY; TYLENOL FOR PAIN AT HOME INEFFECTIVE     34 y.o. Female presents to ED c/o acute, intermittent, sharp, mid-sternal chest tightness that began yesterday around 10 AM (nearly 24 hours ago) while laying down. She reports associated SOB, palpitations and left arm tingling and states she felt as though she has to sit up and adjust her breathing (slow and deep) in order to feel better. She reports 4-5 episodes since yesterday. Denies CP now. Denies tobacco use. Denies OCPs. Reports anemia in the past but denies previous blood transfusion. Denies heavy menses. Denies personal history or family history of MI/DVT/PE or early MI in family. Denies recent travel, recent surgery or unilateral leg pain/swelling. Denies fever, [place], [place], diaphoresis, dizziness or syncope.  Patient denies prior h/o anxiety. She states she hasn't slept since yesterday because she was afraid to fall asleep.     The history is provided by the patient.     Review of patient's allergies indicates:  No Known Allergies  Past Medical History:   Diagnosis Date    Diabetes mellitus     Hypertension      Past Surgical History:   Procedure Laterality Date     SECTION, CLASSIC      x1    TUBAL LIGATION       No family history on file.  Social History     Tobacco Use    Smoking status: Never Smoker    Smokeless tobacco: Never Used   Substance Use Topics    Alcohol use: Yes     Alcohol/week: 1.0 standard drinks     Types: 1 Glasses of wine per week     Comment: social    Drug use: No     Review of Systems   Constitutional: Negative for diaphoresis, fatigue and fever.   Respiratory: Positive for shortness of breath. Negative for cough.    Cardiovascular: Positive for chest pain and palpitations. Negative for leg swelling.   Gastrointestinal: Negative for nausea and  vomiting.   Neurological: Positive for numbness (intermittent - left arm, hand and fingers). Negative for dizziness, syncope and light-headedness.   Psychiatric/Behavioral: Positive for sleep disturbance (afraid to fall  asleep since yesterday.). The patient is nervous/anxious (denies previous episodes of anxiety until now).    All other systems reviewed and are negative.      Physical Exam     Initial Vitals [04/10/20 0733]   BP Pulse Resp Temp SpO2   (!) 149/88 72 18 98.1 °F (36.7 °C) 99 %      MAP       --         Physical Exam    Nursing note and vitals reviewed.  Constitutional: She appears well-developed and well-nourished. She is not diaphoretic. No distress.   HENT:   Head: Normocephalic and atraumatic.   Eyes: Conjunctivae are normal. Pupils are equal, round, and reactive to light.   Neck: Normal range of motion. Neck supple.   Cardiovascular: Normal rate and intact distal pulses.   Pulmonary/Chest: Effort normal. No accessory muscle usage. No tachypnea. No respiratory distress.   Abdominal: She exhibits no distension. There is no tenderness.   Musculoskeletal: Normal range of motion. She exhibits no tenderness.   Neurological: She is alert and oriented to person, place, and time. She has normal strength. Gait normal. GCS eye subscore is 4. GCS verbal subscore is 5. GCS motor subscore is 6.   Skin: Skin is warm. Capillary refill takes less than 2 seconds.   Psychiatric:   Intermittently tearful (crying expressing concerned that something is wrong with her because she has two small kids that need her)         ED Course   Procedures  Labs Reviewed   POCT CMP - Abnormal; Notable for the following components:       Result Value    POC Chloride 110 (*)     POC Glucose 140 (*)     Protein 8.3 (*)     All other components within normal limits   TROPONIN ISTAT   POCT URINE PREGNANCY   POCT CBC   POCT CMP   POCT TROPONIN   POCT B-TYPE NATRIURETIC PEPTIDE (BNP)   POCT B-TYPE NATRIURETIC PEPTIDE (BNP)     EKG  Readings: (Independently Interpreted)   Previous EKG: Compared with most recent EKG Previous EKG Date: 4/10/19. Rhythm: Normal Sinus Rhythm. Heart Rate: 74. Ectopy: No Ectopy. Conduction: 1st Degree AV Block. ST Segments: Normal ST Segments. T Waves: Normal. Axis: Normal. Clinical Impression: Normal Sinus Rhythm Other Impression: same as previous EKG     ECG Results          EKG 12-lead (In process)  Result time 04/10/20 09:55:06    In process by Interface, Lab In [facility] (04/10/20 09:55:06)                 Narrative:    Test Reason : R07.9,    Vent. Rate : 074 BPM     Atrial Rate : 074 BPM     P-R Int : 228 ms          QRS Dur : 084 ms      QT Int : 410 ms       P-R-T Axes : 071 067 031 degrees     QTc Int : 455 ms    Sinus rhythm with 1st degree A-V block  Otherwise normal ECG  When compared with ECG of 10-APR-2019 20:01,  No significant change was found    Referred By: [email or web address]   SELF           Confirmed By:                             Imaging Results          X-Ray Chest PA And Lateral (Final result)  Result time 04/10/20 08:23:18    Final result by Michael Herrera DO (04/10/20 08:23:18)                 Impression:      1.  No acute cardiopulmonary process.      Electronically signed by: Michael Herrera DO  Date:    04/10/2020  Time:    08:23             Narrative:    EXAMINATION:  XR CHEST PA AND LATERAL    CLINICAL HISTORY:  Chest pain, unspecified    TECHNIQUE:  PA and lateral views of the chest were performed.    COMPARISON:  None    FINDINGS:  The lungs are clear and free of infiltrate.  No pleural effusion or pneumothorax. The heart is borderline enlarged.                                 Medical Decision Making:   Differential Diagnosis:   Pneumonia, GERD, anxiety, dysrhythmia, symptomatic anemia, ACS, others.    Clinical Tests:   Lab Tests: Ordered and Reviewed       <> Summary of Lab: 10/22/2019: H/H 10.7/33.2  4/10/2020 H/H 10.7/32.6  Radiological Study: Ordered and Reviewed  Medical Tests: Ordered  and Reviewed  ED Management:  No recurrent CP throughout ED course.     Additional MDM:   PERC Rule:   Age is greater than or equal to 50 = 0.0  Heart Rate is greater than or equal to 100 = 0.0  SaO2 on room air < 95% = 0.0  Unilateral leg swelling = 0.0  Hemoptysis = 0.0  Recent surgery or trauma = 0.0  Prior PE or DVT =  0.0  Hormone use = 0.00  PERC Score = 0    Well's Criteria Score:  -Clinical symptoms of DVT (leg swelling, pain with palpation) = 0.0  -Other diagnosis less likely than pulmonary embolism =            0.0  -Heart Rate >100 =   0.0  -Immobilization (= or > than 3 days) or surgery in the previous 4 weeks = 0.0  -Previous DVT/PE = 0.0  -Hemoptysis =          0.0  -Malignancy =           0.0  Well's Probability Score =    0      Heart Score:    History:          Slightly suspicious.  ECG:             Normal  Age:               Less than 45 years  Risk factors: 1-2 risk factors  Troponin:       Less than or equal to normal limit  Final Score: 1                          Labs Reviewed  Admission on 04/10/2020, Discharged on 04/10/2020   Component Date Value Ref Range Status    POC Preg Test, Ur 04/10/2020 Negative  Negative Final     Acceptable 04/10/2020 Yes   Final    Albumin, POC 04/10/2020 3.7  3.3 - 5.5 g/dL Final    Alkaline Phosphatase, POC 04/10/2020 72  42 - 141 U/L Final    ALT (SGPT), POC 04/10/2020 21  10 - 47 U/L Final    AST (SGOT), POC 04/10/2020 23  11 - 38 U/L Final    POC BUN 04/10/2020 11  7 - 22 mg/dL Final    Calcium, POC 04/10/2020 9.2  8 - 10.3 mg/dL Final    POC Chloride 04/10/2020 110* 98 - 108 mmol/L Final    POC Creatinine 04/10/2020 0.7  0.6 - 1.2 mg/dL Final    POC Glucose 04/10/2020 140* 73 - 118 mg/dL Final    POC Potassium 04/10/2020 4.3  3.6 - 5.1 mmol/L Final    POC Sodium 04/10/2020 140  128 - 145 mmol/L Final    Bilirubin 04/10/2020 0.4  0.2 - 1.6 mg/dL Final    POC TCO2 04/10/2020 24  18 - 33 mmol/L Final    Protein 04/10/2020 8.3* 6.4  - 8.1 g/dL Final    POC Cardiac Troponin I 04/10/2020 0.00  <0.09 ng/mL Final    Sample 04/10/2020 unknown   Final    POC B-Type Natriuretic Peptide 04/10/2020 <5.0  0.0 - 100.0 pg/mL Final        Imaging Reviewed    Imaging Results          X-Ray Chest PA And Lateral (Final result)  Result time 04/10/20 08:23:18    Final result by Michael Herrera DO (04/10/20 08:23:18)                 Impression:      1.  No acute cardiopulmonary process.      Electronically signed by: Michael Herrera DO  Date:    04/10/2020  Time:    08:23             Narrative:    EXAMINATION:  XR CHEST PA AND LATERAL    CLINICAL HISTORY:  Chest pain, unspecified    TECHNIQUE:  PA and lateral views of the chest were performed.    COMPARISON:  None    FINDINGS:  The lungs are clear and free of infiltrate.  No pleural effusion or pneumothorax. The heart is borderline enlarged.                                Medications given in ED    Medications - No data to display    Note was created using voice recognition software. Note may have occasional typographical errors that may not have been identified and edited despite good irene initial review prior to signing.                     Clinical Impression:       ICD-10-CM ICD-9-CM   1. Chest pain, unspecified type R07.9 786.50   2. Chest pain R07.9 786.50   3. Anxiety with limited-symptom attacks F41.8 300.09             ED Disposition Condition    Discharge Stable        ED Prescriptions     Medication Sig Dispense Start Date End Date Auth. Provider    aspirin 81 MG Chew Take 1 tablet (81 mg total) by mouth once daily. 30 tablet 4/10/2020 4/10/2021 Erin Medina MD    hydrOXYzine pamoate (VISTARIL) 50 MG Cap Take 1 capsule (50 mg total) by mouth nightly as needed (difficulty sleeping). 20 capsule 4/10/2020  Erin Medina MD        Follow-up Information     Follow up With Specialties Details Why Contact Info    SHIVA Ventura MD Obstetrics and Gynecology, Obstetrics, Gynecology Call today to  schedule an appointment, for re-evaluation of today's complaint, and ongoing care [address]  [address]  [facility]  [place] [address] [phone]      Elias Valadez MD INTERVENTIONAL CARDIOLOGY, Cardiology Call today to schedule an appointment, for re-evaluation of today's complaint [address]  [place] [address]  [phone]                                       Erin Medina MD  04/10/20 1012

## 2021-07-03 ENCOUNTER — HOSPITAL ENCOUNTER (EMERGENCY)
Facility: HOSPITAL | Age: 36
Discharge: HOME | End: 2021-07-03
Attending: EMERGENCY MEDICINE
Payer: MEDICAID

## 2021-07-03 VITALS
RESPIRATION RATE: 20 BRPM | DIASTOLIC BLOOD PRESSURE: 86 MMHG | TEMPERATURE: 100 F | OXYGEN SATURATION: 100 % | HEART RATE: 94 BPM | SYSTOLIC BLOOD PRESSURE: 141 MMHG | HEIGHT: 66 IN | WEIGHT: 260 LBS | BODY MASS INDEX: 41.78 KG/M2

## 2021-07-03 DIAGNOSIS — U07.1 COVID-19: Primary | ICD-10-CM

## 2021-07-03 DIAGNOSIS — R05.9 COUGH: ICD-10-CM

## 2021-07-03 LAB
B-HCG UR QL: NEGATIVE
CTP QC/QA: YES
CTP QC/QA: YES
POCT GLUCOSE: 183 MG/DL (ref 70–110)
SARS-COV-2 RDRP RESP QL NAA+PROBE: POSITIVE

## 2021-07-03 PROCEDURE — 81025 URINE PREGNANCY TEST: CPT | Mod: ER | Performed by: EMERGENCY MEDICINE

## 2021-07-03 PROCEDURE — U0002 COVID-19 LAB TEST NON-CDC: HCPCS | Mod: ER | Performed by: EMERGENCY MEDICINE

## 2021-07-03 PROCEDURE — 99284 EMERGENCY DEPT VISIT MOD MDM: CPT | Mod: 25,ER

## 2021-07-03 PROCEDURE — 82962 GLUCOSE BLOOD TEST: CPT | Mod: ER

## 2021-07-03 RX ORDER — ALBUTEROL SULFATE 90 UG/1
1-2 AEROSOL, METERED RESPIRATORY (INHALATION) EVERY 6 HOURS PRN
Qty: 18 G | Refills: 0 | Status: SHIPPED | OUTPATIENT
Start: 2021-07-03 | End: 2022-01-08

## 2021-07-03 RX ORDER — GUAIFENESIN/DEXTROMETHORPHAN 100-10MG/5
5 SYRUP ORAL 4 TIMES DAILY PRN
Qty: 120 ML | Refills: 0 | Status: SHIPPED | OUTPATIENT
Start: 2021-07-03 | End: 2021-07-13

## 2021-07-04 DIAGNOSIS — U07.1 COVID-19 VIRUS DETECTED: ICD-10-CM

## 2022-01-08 ENCOUNTER — HOSPITAL ENCOUNTER (EMERGENCY)
Facility: HOSPITAL | Age: 37
Discharge: HOME | End: 2022-01-08
Attending: INTERNAL MEDICINE
Payer: MEDICAID

## 2022-01-08 VITALS
BODY MASS INDEX: 44.15 KG/M2 | OXYGEN SATURATION: 99 % | TEMPERATURE: 98 F | RESPIRATION RATE: 20 BRPM | DIASTOLIC BLOOD PRESSURE: 82 MMHG | SYSTOLIC BLOOD PRESSURE: 132 MMHG | HEIGHT: 65 IN | HEART RATE: 98 BPM | WEIGHT: 265 LBS

## 2022-01-08 DIAGNOSIS — B34.9 VIRAL SYNDROME: Primary | ICD-10-CM

## 2022-01-08 LAB
B-HCG UR QL: NEGATIVE
CTP QC/QA: YES
POCT GLUCOSE: 142 MG/DL (ref 70–110)

## 2022-01-08 PROCEDURE — U0005 INFEC AGEN DETEC AMPLI PROBE: HCPCS | Performed by: INTERNAL MEDICINE

## 2022-01-08 PROCEDURE — U0003 INFECTIOUS AGENT DETECTION BY NUCLEIC ACID (DNA OR RNA); SEVERE ACUTE RESPIRATORY SYNDROME CORONAVIRUS 2 (SARS-COV-2) (CORONAVIRUS DISEASE [COVID-19]), AMPLIFIED PROBE TECHNIQUE, MAKING USE OF HIGH THROUGHPUT TECHNOLOGIES AS DESCRIBED BY CMS-2020-01-R: HCPCS | Performed by: INTERNAL MEDICINE

## 2022-01-08 PROCEDURE — 81025 URINE PREGNANCY TEST: CPT | Mod: ER | Performed by: INTERNAL MEDICINE

## 2022-01-08 PROCEDURE — 99284 EMERGENCY DEPT VISIT MOD MDM: CPT | Mod: 25,ER

## 2022-01-08 RX ORDER — PEN NEEDLE, DIABETIC 32 GX 1/4"
NEEDLE, DISPOSABLE MISCELLANEOUS
COMMUNITY
Start: 2021-12-09

## 2022-01-08 RX ORDER — AZELASTINE 1 MG/ML
2 SPRAY, METERED NASAL 2 TIMES DAILY
Qty: 30 ML | Refills: 0 | Status: SHIPPED | OUTPATIENT
Start: 2022-01-08 | End: 2024-02-14 | Stop reason: ALTCHOICE

## 2022-01-08 RX ORDER — FERROUS GLUCONATE 324(38)MG
1 TABLET ORAL NIGHTLY
COMMUNITY
Start: 2021-12-28

## 2022-01-08 RX ORDER — OMEPRAZOLE 20 MG/1
20 CAPSULE, DELAYED RELEASE ORAL DAILY
COMMUNITY
Start: 2021-12-09

## 2022-01-08 RX ORDER — LOSARTAN POTASSIUM AND HYDROCHLOROTHIAZIDE 12.5; 5 MG/1; MG/1
1 TABLET ORAL DAILY
COMMUNITY
Start: 2021-12-17

## 2022-01-08 RX ORDER — IBUPROFEN 800 MG/1
800 TABLET ORAL EVERY 8 HOURS PRN
Qty: 30 TABLET | Refills: 0 | OUTPATIENT
Start: 2022-01-08 | End: 2022-05-02

## 2022-01-08 RX ORDER — FLUTICASONE PROPIONATE 50 MCG
2 SPRAY, SUSPENSION (ML) NASAL DAILY
Qty: 15 G | Refills: 0 | Status: SHIPPED | OUTPATIENT
Start: 2022-01-08

## 2022-01-08 RX ORDER — LIRAGLUTIDE 6 MG/ML
0.6 INJECTION SUBCUTANEOUS DAILY
COMMUNITY
End: 2024-02-14 | Stop reason: DRUGHIGH

## 2022-01-08 NOTE — ED PROVIDER NOTES
Encounter Date: 2022       History     Chief Complaint   Patient presents with    Sore Throat     X 3 days and frontal sinus pressure     36-year-old female presents to the emergency department complaining of sinus congestion and sore throat x3 days.  She denies fever/chills/nausea/vomiting/chest pain/shortness of breath.    The history is provided by the patient. No  was used.     Review of patient's allergies indicates:  No Known Allergies  Past Medical History:   Diagnosis Date    Diabetes mellitus     Hypertension      Past Surgical History:   Procedure Laterality Date     SECTION, CLASSIC      x1    TUBAL LIGATION       History reviewed. No pertinent family history.  Social History     Tobacco Use    Smoking status: Never Smoker    Smokeless tobacco: Never Used   Substance Use Topics    Alcohol use: Yes     Alcohol/week: 1.0 standard drink     Types: 1 Glasses of wine per week     Comment: social    Drug use: No     Review of Systems   Constitutional: Negative for chills and fever.   HENT: Positive for congestion and sore throat.    Respiratory: Negative for shortness of breath and wheezing.    Cardiovascular: Negative for chest pain.   Gastrointestinal: Negative for abdominal pain, diarrhea, nausea and vomiting.   All other systems reviewed and are negative.      Physical Exam     Initial Vitals [22 0202]   BP Pulse Resp Temp SpO2   132/82 98 20 98.3 °F (36.8 °C) 99 %      MAP       --         Physical Exam    Nursing note and vitals reviewed.  Constitutional: She is not diaphoretic. No distress.   Obese   HENT:   Head: Normocephalic and atraumatic.   Right Ear: External ear normal.   Left Ear: External ear normal.   Clear postnasal drip, posterior oropharyngeal erythema without exudate or edema   Eyes: Conjunctivae and EOM are normal.   Neck:   Normal range of motion.  Cardiovascular: Normal rate, regular rhythm and intact distal pulses.   Pulmonary/Chest: Breath  sounds normal. No respiratory distress.   Abdominal: Abdomen is soft. Bowel sounds are normal.   Musculoskeletal:      Cervical back: Normal range of motion.     Neurological: She is alert.   Skin: Skin is warm and dry. Capillary refill takes less than 2 seconds.   Psychiatric: She has a normal mood and affect. Thought content normal.         ED Course   Procedures  Labs Reviewed   POCT GLUCOSE - Abnormal; Notable for the following components:       Result Value    POCT Glucose 142 (*)     All other components within normal limits   SARS-COV-2 (COVID-19) QUALITATIVE PCR   POCT GLUCOSE, HAND-HELD DEVICE   POCT URINE PREGNANCY          Imaging Results    None          Medications - No data to display  Medical Decision Making:   Initial Assessment:   36-year-old female presents to the emergency department complaining of sinus congestion and sore throat x3 days.  She denies fever/chills/nausea/vomiting/chest pain/shortness of breath.  ED Management:  Routine COVID-19 screen was performed and patient received instructions for acute viral syndrome.  Prescriptions for Astelin/fluticasone/ibuprofen were given and the patient was advised to follow-up with her primary care physician within the next week for re-evaluation/return to the emergency department if condition worsens.                      Clinical Impression:   Final diagnoses:  [B34.9] Viral syndrome (Primary)          ED Disposition Condition    Discharge Stable        ED Prescriptions     Medication Sig Dispense Start Date End Date Auth. Provider    azelastine (ASTELIN) 137 mcg (0.1 %) nasal spray 2 sprays (274 mcg total) by Nasal route 2 (two) times daily. 30 mL 1/8/2022 3/4/2022 Aakash Hwang MD    fluticasone propionate (FLONASE) 50 mcg/actuation nasal spray 2 sprays (100 mcg total) by Each Nostril route once daily. 15 g 1/8/2022  aAkash Hwang MD    ibuprofen (ADVIL,MOTRIN) 800 MG tablet Take 1 tablet (800 mg total) by mouth every 8 (eight) hours as  needed for Pain. 30 tablet 1/8/2022  Aakash Hwang MD        Follow-up Information     Follow up With Specialties Details Why Contact Info    SHIVA Ventura MD Obstetrics and Gynecology, Obstetrics, Gynecology Schedule an appointment as soon as possible for a visit in 1 week For reevaluation [address] [place] [id number]  [phone]             Aakash Hwang MD  01/08/22 0307

## 2022-01-08 NOTE — Clinical Note
"Jacinda"Gavin Hwang was seen and treated in our emergency department on 1/8/2022.     COVID-19 is present in our communities across the state. There is limited testing for COVID at this time, so not all patients can be tested. In this situation, your employee meets the following criteria:    Jacinda Hwang has met the criteria for COVID-19 testing based upon symptoms, travel, and/or potential exposure. The test has been completed and is pending results at this time. During this time the employee is not able to work and should be quarantined per the Centers for Disease Control timelines.     If you have any questions or concerns, or if I can be of further assistance, please do not hesitate to contact me.    Sincerely,             jzamine REMY"

## 2022-01-10 LAB
SARS-COV-2 RNA RESP QL NAA+PROBE: NOT DETECTED
SARS-COV-2- CYCLE NUMBER: NORMAL

## 2022-05-02 ENCOUNTER — HOSPITAL ENCOUNTER (EMERGENCY)
Facility: HOSPITAL | Age: 37
Discharge: HOME | End: 2022-05-02
Attending: EMERGENCY MEDICINE
Payer: MEDICAID

## 2022-05-02 VITALS
WEIGHT: 260 LBS | TEMPERATURE: 98 F | RESPIRATION RATE: 19 BRPM | SYSTOLIC BLOOD PRESSURE: 144 MMHG | BODY MASS INDEX: 43.27 KG/M2 | OXYGEN SATURATION: 100 % | DIASTOLIC BLOOD PRESSURE: 82 MMHG | HEART RATE: 80 BPM

## 2022-05-02 DIAGNOSIS — R51.9 ACUTE NONINTRACTABLE HEADACHE, UNSPECIFIED HEADACHE TYPE: Primary | ICD-10-CM

## 2022-05-02 LAB
B-HCG UR QL: NEGATIVE
CTP QC/QA: YES

## 2022-05-02 PROCEDURE — 96372 THER/PROPH/DIAG INJ SC/IM: CPT | Performed by: EMERGENCY MEDICINE

## 2022-05-02 PROCEDURE — 25000003 PHARM REV CODE 250: Mod: ER | Performed by: EMERGENCY MEDICINE

## 2022-05-02 PROCEDURE — 63600175 PHARM REV CODE 636 W HCPCS: Mod: ER | Performed by: EMERGENCY MEDICINE

## 2022-05-02 PROCEDURE — 99285 EMERGENCY DEPT VISIT HI MDM: CPT | Mod: 25,ER

## 2022-05-02 PROCEDURE — 81025 URINE PREGNANCY TEST: CPT | Mod: ER | Performed by: EMERGENCY MEDICINE

## 2022-05-02 RX ORDER — ACETAMINOPHEN 500 MG
1000 TABLET ORAL
Status: COMPLETED | OUTPATIENT
Start: 2022-05-02 | End: 2022-05-02

## 2022-05-02 RX ORDER — ACETAMINOPHEN 500 MG
1000 TABLET ORAL EVERY 6 HOURS PRN
Qty: 30 TABLET | Refills: 0 | OUTPATIENT
Start: 2022-05-02 | End: 2022-10-31

## 2022-05-02 RX ORDER — PROMETHAZINE HYDROCHLORIDE 25 MG/1
25 TABLET ORAL EVERY 6 HOURS PRN
Qty: 15 TABLET | Refills: 0 | Status: SHIPPED | OUTPATIENT
Start: 2022-05-02 | End: 2022-10-31

## 2022-05-02 RX ORDER — DIPHENHYDRAMINE HCL 25 MG
25 CAPSULE ORAL EVERY 6 HOURS PRN
Qty: 20 CAPSULE | Refills: 0 | Status: SHIPPED | OUTPATIENT
Start: 2022-05-02

## 2022-05-02 RX ORDER — KETOROLAC TROMETHAMINE 30 MG/ML
30 INJECTION, SOLUTION INTRAMUSCULAR; INTRAVENOUS
Status: COMPLETED | OUTPATIENT
Start: 2022-05-02 | End: 2022-05-02

## 2022-05-02 RX ORDER — IBUPROFEN 600 MG/1
600 TABLET ORAL EVERY 6 HOURS PRN
Qty: 20 TABLET | Refills: 0 | OUTPATIENT
Start: 2022-05-02 | End: 2022-10-31

## 2022-05-02 RX ADMIN — ACETAMINOPHEN 1000 MG: 500 TABLET ORAL at 07:05

## 2022-05-02 RX ADMIN — KETOROLAC TROMETHAMINE 30 MG: 30 INJECTION, SOLUTION INTRAMUSCULAR at 07:05

## 2022-05-02 NOTE — Clinical Note
"Jacinda Mejiaamira Hwang was seen and treated in our emergency department on 5/2/2022.  She may return to work on 05/04/2022.       If you have any questions or concerns, please don't hesitate to call.      Yuliet Bernard, DO"

## 2022-05-02 NOTE — FIRST PROVIDER EVALUATION
" Emergency Department TeleTriage Encounter Note      CHIEF COMPLAINT    Chief Complaint   Patient presents with    Headache     Pt states," I have a headache for two days."       VITAL SIGNS   Initial Vitals [05/02/22 1656]   BP Pulse Resp Temp SpO2   (!) 164/99 86 16 98.2 °F (36.8 °C) 99 %      MAP       --            ALLERGIES    Review of patient's allergies indicates:  No Known Allergies    PROVIDER TRIAGE NOTE      28-year-old female presents ER for evaluation of headache ongoing for last 2 days.  She reports having some blurred vision associated with this.  No fever chills.  No neck pain or stiffness    PE:  Patient alert and oriented. No acute distress    Initial orders will be placed and care will be transferred to an alternate provider when patient is roomed for a full evaluation. Any additional orders and the final disposition will be determined by that provider.      ORDERS  Labs Reviewed   POCT URINE PREGNANCY       ED Orders (720h ago, onward)    Start Ordered     Status Ordering Provider    05/02/22 1725 05/02/22 1724  Visual acuity screening  Once         Ordered DAVONTE BERNAL    05/02/22 1658 05/02/22 1657  POCT urine pregnancy  Once         Ordered DALY DUENAS            Virtual Visit Note: The provider triage portion of this emergency department evaluation and documentation was performed via [employer], a HIPAA-compliant telemedicine application, in concert with a tele-presenter in the room. A face to face patient evaluation with one of my colleagues will occur once the patient is placed in an emergency department room.      DISCLAIMER: This note was prepared with [employer] voice recognition transcription software. Garbled syntax, mangled pronouns, and other bizarre constructions may be attributed to that software system.  "

## 2022-05-03 NOTE — ED PROVIDER NOTES
"Encounter Date: 2022    SCRIBE #1 NOTE: I, Jean Enma, am scribing for, and in the presence of,  Yuliet Bernard DO. I have scribed the following portions of the note - Other sections scribed: HPI, ROS, PE, Part of MDM.       History     Chief Complaint   Patient presents with    Headache     Pt states," I have a headache for two days."     36 y.o. female who presents to the ED for chief complaint of posterior headache onset yesterday, and she rates the pain 8/10 while describing the pain as "throbbing".  Patient states this is not the worst headache of her life.  Patient denies injury.  She denies rhinorrhea, sore throat, cough, congestion, numbness, weakness, or tingling. She reports that there is no radiation of the headache. She states she has "hypertension but takes her medicine for it". She has attempted treatment with ibuprofen and tylenol with no relief. She denies any recent stress or drastic changes in lifestyle. There are no other signs or symptoms at this time.      The history is provided by the patient. No  was used.     Review of patient's allergies indicates:  No Known Allergies  Past Medical History:   Diagnosis Date    Diabetes mellitus     Hypertension      Past Surgical History:   Procedure Laterality Date     SECTION, CLASSIC      x1    SALPINGOOPHORECTOMY  2020    left ovary and tube    TUBAL LIGATION       No family history on file.  Social History     Tobacco Use    Smoking status: Never Smoker    Smokeless tobacco: Never Used   Substance Use Topics    Alcohol use: Yes     Alcohol/week: 1.0 standard drink     Types: 1 Glasses of wine per week     Comment: social    Drug use: No     Review of Systems   Constitutional: Negative for fever.   HENT: Negative for congestion, rhinorrhea and sore throat.    Respiratory: Negative for cough and shortness of breath.    Cardiovascular: Negative for chest pain.   Gastrointestinal: Negative for nausea. "   Genitourinary: Negative for dysuria.   Musculoskeletal: Negative for back pain.   Skin: Negative for rash.   Neurological: Positive for headaches. Negative for weakness and numbness (or tingling).   Hematological: Does not bruise/bleed easily.   All other systems reviewed and are negative.      Physical Exam     Patient gave consent to have physical exam performed.  Initial Vitals [05/02/22 1656]   BP Pulse Resp Temp SpO2   (!) 164/99 86 16 98.2 °F (36.8 °C) 99 %      MAP       --         Physical Exam    Nursing note and vitals reviewed.  Constitutional: She appears well-developed and well-nourished.   HENT:   Head: Normocephalic and atraumatic.   Right Ear: External ear normal.   Left Ear: External ear normal.   Eyes: Conjunctivae and EOM are normal. Pupils are equal, round, and reactive to light. Right eye exhibits no discharge. Left eye exhibits no discharge.   Neck: Neck supple.   Normal range of motion.  Cardiovascular: Normal rate, regular rhythm, normal heart sounds and intact distal pulses. Exam reveals no gallop and no friction rub.    No murmur heard.  Heart is normal.   Pulmonary/Chest: Breath sounds normal. No respiratory distress. She has no wheezes. She has no rhonchi. She has no rales. She exhibits no tenderness.   Lungs are normal.   Abdominal: Abdomen is soft. Bowel sounds are normal. She exhibits no distension and no mass. There is no abdominal tenderness.   No CVA tenderness, and abdomen is normal. There is no rebound and no guarding.   Musculoskeletal:         General: No tenderness or edema. Normal range of motion.      Cervical back: Normal range of motion and neck supple.     Neurological: She is alert and oriented to person, place, and time. She has normal strength and normal reflexes. No cranial nerve deficit or sensory deficit. GCS score is 15. GCS eye subscore is 4. GCS verbal subscore is 5. GCS motor subscore is 6.   Cranial nerves 2-12 intact, sensation grossly intact,  equal  bilateral.  Muscle strength 5/5 bilateral upper and lower extremities.   Skin: Skin is warm and dry. Capillary refill takes less than 2 seconds.   Psychiatric: She has a normal mood and affect.         ED Course   Procedures  Labs Reviewed   POCT URINE PREGNANCY          Imaging Results          CT Head Without Contrast (Final result)  Result time 05/02/22 19:15:59    Final result by Allison Ramirez MD (05/02/22 19:15:59)                 Impression:      No acute intracranial abnormality detected.      Electronically signed by: Allison Ramirez  Date:    05/02/2022  Time:    19:15             Narrative:    EXAMINATION:  CT OF THE HEAD WITHOUT    CLINICAL HISTORY:  Headache, sudden, severe;    TECHNIQUE:  5 mm unenhanced axial images were obtained from the skull base to the vertex.    COMPARISON:  None.    FINDINGS:  The ventricles, basal cisterns, and cortical sulci are within normal limits for patient's stated age. There is no acute intracranial hemorrhage, territorial infarct or mass effect, or midline shift. The visualized paranasal sinuses and mastoid air cells are clear.                                 Medications   acetaminophen tablet 1,000 mg (1,000 mg Oral Given 5/2/22 1916)   ketorolac injection 30 mg (30 mg Intramuscular Given 5/2/22 1927)     Medical Decision Making:   History:   Old Medical Records: I decided to obtain old medical records.  Clinical Tests:   Lab Tests: Ordered and Reviewed  The following lab test(s) were unremarkable: UPT  Radiological Study: Ordered and Reviewed  Chief complaint: headache  Differential diagnosis: Hypertensive headache.  I offered patient Toradol, Benadryl, and Compazine treatment in the ER for her headache however she does not have a ride home today.  Patient states she prefers to drive herself home.  Will treat with Toradol and Tylenol in the ED.  Treatment in the ED: PE, acetaminophen tablet 1,000 mg ED 1 Time   ketorolac injection 30 mg ED 1 Time      Patient  reports feeling better after treatment in the ER.      Discussed treatment, prescriptions, labs, and imaging results.    Discharge home with ibuprofen (ADVIL,MOTRIN) 600 MG tablet Every 6 hours PRN   acetaminophen (TYLENOL) 500 MG tablet Every 6 hours PRN   diphenhydrAMINE (BENADRYL) 25 mg capsule Every 6 hours PRN   promethazine (PHENERGAN) 25 MG tablet Every 6 hours PRN   ibuprofen (ADVIL,MOTRIN) 600 MG tablet Every 6 hours PRN   acetaminophen (TYLENOL) 500 MG tablet Every 6 hours PRN   diphenhydrAMINE (BENADRYL) 25 mg capsule Every 6 hours PRN   promethazine (PHENERGAN) 25 MG tablet Every 6 hours PRN     Fill and take prescriptions as directed.  Return to the ED if symptoms worsen or do not resolve.   Answered questions and discussed discharge plan.    Patient feels better and is ready for discharge.  Follow up with PCP/specialist in 1 day.          Scribe Attestation:   Scribe #1: I performed the above scribed service and the documentation accurately describes the services I performed. I attest to the accuracy of the note.                I, Dr. Yuliet Bernard, personally performed the services described in this documentation. This document was produced by a scribe under my direction and in my presence. All medical record entries made by the scribe were at my direction and in my presence.  I have reviewed the chart and agree that the record reflects my personal performance and is accurate and complete. Yuliet Bernard DO.     05/02/2022 8:28 PM    Clinical Impression:   Final diagnoses:  [R51.9] Acute nonintractable headache, unspecified headache type (Primary)          ED Disposition Condition    Discharge Stable        ED Prescriptions     Medication Sig Dispense Start Date End Date Auth. Provider    ibuprofen (ADVIL,MOTRIN) 600 MG tablet Take 1 tablet (600 mg total) by mouth every 6 (six) hours as needed for Pain (Take with food as needed for mild-to-moderate pain). 20 tablet 5/2/2022  Yuliet Bernard DO     acetaminophen (TYLENOL) 500 MG tablet Take 2 tablets (1,000 mg total) by mouth every 6 (six) hours as needed for Pain. 30 tablet 5/2/2022  Yuliet Bernard,     diphenhydrAMINE (BENADRYL) 25 mg capsule Take 1 capsule (25 mg total) by mouth every 6 (six) hours as needed for Itching or Allergies (Take if headache does not resolve). 20 capsule 5/2/2022  Yuliet Bernard DO    promethazine (PHENERGAN) 25 MG tablet Take 1 tablet (25 mg total) by mouth every 6 (six) hours as needed for Nausea (Taken headache does not resolve). 15 tablet 5/2/2022  Yuliet Bernard, DO        Follow-up Information     Follow up With Specialties Details Why Contact Tonja Ventura MD Obstetrics and Gynecology, Obstetrics, Gynecology Schedule an appointment as soon as possible for a visit  As needed [address]  [address]  [facility] [id number] [phone]      [facility] - Emergency Dept Emergency Medicine Go to  Please go to [facility] emergency department if symptoms worsen [address] [place] [address] [phone]           Yuliet Bernard DO  05/02/22 2029     <<-----Click here for Discharge Medication Review

## 2022-09-15 NOTE — ED NOTES
Neuro:      AAOx4  HEENT:    WDL  Resp:        Airway patent, respirations even/unlabored. No SOB noted.  Cardiac:   Skin pink/warm/dry, pulses intact. Pt denies any chest pain  Abdomen Soft, non-tender to palpation, denies N/V/D  :           See additional assessment notes.  Musculoskeletal: Moves all extremities equally, ROM intact  Skin:         Skin is dry and intact.   yes

## 2022-10-31 ENCOUNTER — HOSPITAL ENCOUNTER (EMERGENCY)
Facility: HOSPITAL | Age: 37
Discharge: HOME | End: 2022-10-31
Attending: EMERGENCY MEDICINE
Payer: MEDICAID

## 2022-10-31 VITALS
OXYGEN SATURATION: 96 % | SYSTOLIC BLOOD PRESSURE: 156 MMHG | HEIGHT: 65 IN | BODY MASS INDEX: 43.32 KG/M2 | TEMPERATURE: 98 F | DIASTOLIC BLOOD PRESSURE: 98 MMHG | HEART RATE: 79 BPM | RESPIRATION RATE: 16 BRPM | WEIGHT: 260 LBS

## 2022-10-31 DIAGNOSIS — J11.1 INFLUENZA-LIKE ILLNESS: ICD-10-CM

## 2022-10-31 DIAGNOSIS — Z20.828 EXPOSURE TO INFLUENZA: Primary | ICD-10-CM

## 2022-10-31 LAB
B-HCG UR QL: NEGATIVE
BILIRUBIN, POC UA: ABNORMAL
BLOOD, POC UA: ABNORMAL
CLARITY, POC UA: CLEAR
COLOR, POC UA: YELLOW
CTP QC/QA: YES
GLUCOSE, POC UA: ABNORMAL
INFLUENZA A ANTIGEN, POC: NEGATIVE
INFLUENZA B ANTIGEN, POC: NEGATIVE
KETONES, POC UA: ABNORMAL
LEUKOCYTE EST, POC UA: NEGATIVE
NITRITE, POC UA: NEGATIVE
PH UR STRIP: 5 [PH]
POCT GLUCOSE: 99 MG/DL (ref 70–110)
PROTEIN, POC UA: ABNORMAL
SPECIFIC GRAVITY, POC UA: >=1.03
UROBILINOGEN, POC UA: 0.2 E.U./DL

## 2022-10-31 PROCEDURE — 87804 INFLUENZA ASSAY W/OPTIC: CPT | Mod: 59,ER

## 2022-10-31 PROCEDURE — 63600175 PHARM REV CODE 636 W HCPCS: Mod: ER | Performed by: EMERGENCY MEDICINE

## 2022-10-31 PROCEDURE — 82962 GLUCOSE BLOOD TEST: CPT | Mod: ER

## 2022-10-31 PROCEDURE — 81025 URINE PREGNANCY TEST: CPT | Mod: ER | Performed by: NURSE PRACTITIONER

## 2022-10-31 PROCEDURE — 99284 EMERGENCY DEPT VISIT MOD MDM: CPT | Mod: ER

## 2022-10-31 PROCEDURE — 96372 THER/PROPH/DIAG INJ SC/IM: CPT | Performed by: EMERGENCY MEDICINE

## 2022-10-31 RX ORDER — KETOROLAC TROMETHAMINE 30 MG/ML
30 INJECTION, SOLUTION INTRAMUSCULAR; INTRAVENOUS
Status: COMPLETED | OUTPATIENT
Start: 2022-10-31 | End: 2022-10-31

## 2022-10-31 RX ORDER — ACETAMINOPHEN 500 MG
1000 TABLET ORAL EVERY 6 HOURS PRN
Qty: 30 TABLET | Refills: 0 | OUTPATIENT
Start: 2022-10-31 | End: 2023-04-29

## 2022-10-31 RX ORDER — OSELTAMIVIR PHOSPHATE 75 MG/1
75 CAPSULE ORAL 2 TIMES DAILY
Qty: 10 CAPSULE | Refills: 0 | Status: SHIPPED | OUTPATIENT
Start: 2022-10-31 | End: 2022-11-05

## 2022-10-31 RX ORDER — IBUPROFEN 600 MG/1
600 TABLET ORAL EVERY 6 HOURS PRN
Qty: 20 TABLET | Refills: 0 | OUTPATIENT
Start: 2022-10-31 | End: 2023-04-29

## 2022-10-31 RX ORDER — PROMETHAZINE HYDROCHLORIDE AND DEXTROMETHORPHAN HYDROBROMIDE 6.25; 15 MG/5ML; MG/5ML
5 SYRUP ORAL 3 TIMES DAILY PRN
Qty: 200 ML | Refills: 0 | Status: SHIPPED | OUTPATIENT
Start: 2022-10-31 | End: 2022-11-10

## 2022-10-31 RX ADMIN — KETOROLAC TROMETHAMINE 30 MG: 30 INJECTION, SOLUTION INTRAMUSCULAR at 12:10

## 2022-10-31 NOTE — ED PROVIDER NOTES
"Encounter Date: 10/31/2022    SCRIBE #1 NOTE: I, Shirin Kumar, am scribing for, and in the presence of,  Yuliet Bernard DO. I have scribed the following portions of the note - Other sections scribed: HPI; ROS; PE.     History     Chief Complaint   Patient presents with    URI     Pt states," I have a cough and runny nose for two days. When I lay down my chest feels tight."     Jacinda Hwang is a 37 y.o. female with Hx of DM and HTN who presents to the ED for chief complaint of cough and rhinorrhea onset 2 days ago. Patient reports her daughter has the flu. She attempted treatment with Tylenol Cold Severe with no relief. Patient denies fever or dysuria. She does not have any medical allergies. No further complaints at this time.       The history is provided by the patient. No  was used.   Review of patient's allergies indicates:  No Known Allergies  Past Medical History:   Diagnosis Date    Diabetes mellitus     Hypertension      Past Surgical History:   Procedure Laterality Date     SECTION, CLASSIC      x1    SALPINGOOPHORECTOMY  2020    left ovary and tube    TUBAL LIGATION       No family history on file.  Social History     Tobacco Use    Smoking status: Never    Smokeless tobacco: Never   Substance Use Topics    Alcohol use: Yes     Alcohol/week: 1.0 standard drink     Types: 1 Glasses of wine per week     Comment: social    Drug use: No     Review of Systems   Constitutional:  Negative for fever.   HENT:  Positive for rhinorrhea. Negative for congestion, sore throat and trouble swallowing.    Respiratory:  Positive for cough. Negative for shortness of breath.    Cardiovascular:  Negative for chest pain.   Gastrointestinal:  Negative for abdominal pain, constipation, diarrhea, nausea and vomiting.   Genitourinary:  Negative for dysuria, flank pain, frequency and urgency.   Musculoskeletal:  Negative for back pain.   Skin:  Negative for rash.   Neurological:  Negative for " headaches.   All other systems reviewed and are negative.    Physical Exam     Initial Vitals [10/31/22 0945]   BP Pulse Resp Temp SpO2   (!) 156/98 79 16 98.6 °F (37 °C) 96 %      MAP       --         Patient gave consent to have physical exam performed.    Physical Exam    Nursing note and vitals reviewed.  Constitutional: She appears well-developed and well-nourished.   HENT:   Head: Normocephalic and atraumatic.   Right Ear: Tympanic membrane and external ear normal.   Left Ear: Tympanic membrane and external ear normal.   Nose: Mucosal edema and rhinorrhea present.   Eyes: Conjunctivae and EOM are normal. Pupils are equal, round, and reactive to light. Right eye exhibits no discharge. Left eye exhibits no discharge.   Neck: Neck supple.   Normal range of motion.  Cardiovascular:  Normal rate, regular rhythm, normal heart sounds and intact distal pulses.     Exam reveals no gallop and no friction rub.       No murmur heard.  Pulmonary/Chest: Breath sounds normal. No respiratory distress. She has no wheezes. She has no rhonchi. She has no rales. She exhibits no tenderness.   Abdominal: Abdomen is soft. Bowel sounds are normal. She exhibits no distension and no mass. There is no abdominal tenderness.   No CVA tenderness There is no rebound and no guarding.   Musculoskeletal:         General: No tenderness or edema. Normal range of motion.      Cervical back: Normal range of motion and neck supple.     Neurological: She is alert and oriented to person, place, and time.   Skin: Skin is warm and dry.   Psychiatric: She has a normal mood and affect.       ED Course   Procedures  Labs Reviewed   POCT URINALYSIS W/O SCOPE - Abnormal; Notable for the following components:       Result Value    Glucose, UA Trace (*)     Bilirubin, UA 1+ (*)     Ketones, UA Trace (*)     Spec Grav UA >=1.030 (*)     Blood, UA 3+ (*)     Protein, UA 3+ (*)     All other components within normal limits   POCT URINE PREGNANCY   POCT  URINALYSIS W/O SCOPE   POCT INFLUENZA A/B MOLECULAR   POCT GLUCOSE, HAND-HELD DEVICE   POCT RAPID INFLUENZA A/B   POCT GLUCOSE          Imaging Results    None          Medications   ketorolac injection 30 mg (30 mg Intramuscular Given 10/31/22 1217)     Medical Decision Making:   History:   Old Medical Records: I decided to obtain old medical records.  Clinical Tests:   Lab Tests: Ordered and Reviewed  The following lab test(s) were unremarkable: UPT     Chief complaint:  Differential diagnosis: Pregnancy, Viral Illness, Hyperglycemia, Hypoglycemia, Influenza A/B, Otitis Media, UTI.     Treatment in the ED: PE,   ketorolac injection 30 mg        Patient reports feeling better after treatment in the ER.      Discussed treatment, prescriptions, and labs.    Fill and take prescriptions as directed.  Return to the ED if symptoms worsen or do not resolve.   Answered questions and discussed discharge plan.    Patient feels better and is ready for discharge.  Follow up with PCP/specialist in 1 day.       Scribe Attestation:   Scribe #1: I performed the above scribed service and the documentation accurately describes the services I performed. I attest to the accuracy of the note.             I, Dr. Yuliet Bernard, personally performed the services described in this documentation. This document was produced by a scribe under my direction and in my presence. All medical record entries made by the scribe were at my direction and in my presence.  I have reviewed the chart and agree that the record reflects my personal performance and is accurate and complete. Yuliet Bernard, DO.     10/31/2022 1:39 PM         Clinical Impression:   Final diagnoses:  [Z20.828] Exposure to influenza (Primary)  [J11.1] Influenza-like illness      ED Disposition Condition    Discharge Stable          ED Prescriptions       Medication Sig Dispense Start Date End Date Auth. Provider    oseltamivir (TAMIFLU) 75 MG capsule Take 1 capsule (75 mg  total) by mouth 2 (two) times daily. for 5 days 10 capsule 10/31/2022 11/5/2022 Yuliet Bernard,     ibuprofen (ADVIL,MOTRIN) 600 MG tablet Take 1 tablet (600 mg total) by mouth every 6 (six) hours as needed for Pain (Take with food as needed for mild-to-moderate pain). 20 tablet 10/31/2022 -- Yuliet Bernard DO    acetaminophen (TYLENOL) 500 MG tablet Take 2 tablets (1,000 mg total) by mouth every 6 (six) hours as needed for Pain. 30 tablet 10/31/2022 -- Yuliet Bernard DO    promethazine-dextromethorphan (PROMETHAZINE-DM) 6.25-15 mg/5 mL Syrp Take 5 mLs by mouth 3 (three) times daily as needed (cougn and congestion). 200 mL 10/31/2022 11/10/2022 Yuliet Bernard DO          Follow-up Information       Follow up With Specialties Details Why Contact Info    SHIVA Ventura MD Obstetrics and Gynecology, Obstetrics, Gynecology Schedule an appointment as soon as possible for a visit in 1 day  [address] [place] [address]  [facility]  [facility] [address] [phone]      [facility] - Emergency Dept Emergency Medicine Go to  Please go to [facility] emergency department if symptoms worsen [address] [place] [address] [phone]             Yuliet Bernard DO  10/31/22 1344       Yuliet Bernard DO  10/31/22 4671

## 2022-10-31 NOTE — Clinical Note
"Jacinda Mejiaamira Hwang was seen and treated in our emergency department on 10/31/2022.  She may return to work on 11/04/2022.       If you have any questions or concerns, please don't hesitate to call.      Yuliet Bernard, DO"

## 2023-03-09 LAB
HPV MRNA E6/E7, SUREPATH VIAL: NOT DETECTED
PAP RECOMMENDATION EXT: NORMAL

## 2023-04-29 ENCOUNTER — HOSPITAL ENCOUNTER (EMERGENCY)
Facility: HOSPITAL | Age: 38
Discharge: HOME | End: 2023-04-29
Attending: EMERGENCY MEDICINE
Payer: COMMERCIAL

## 2023-04-29 VITALS
OXYGEN SATURATION: 98 % | HEIGHT: 65 IN | SYSTOLIC BLOOD PRESSURE: 117 MMHG | TEMPERATURE: 98 F | RESPIRATION RATE: 16 BRPM | BODY MASS INDEX: 43.32 KG/M2 | DIASTOLIC BLOOD PRESSURE: 78 MMHG | HEART RATE: 82 BPM | WEIGHT: 260 LBS

## 2023-04-29 DIAGNOSIS — L03.317 CELLULITIS OF BUTTOCK: Primary | ICD-10-CM

## 2023-04-29 LAB
B-HCG UR QL: NEGATIVE
CTP QC/QA: YES
POCT GLUCOSE: 84 MG/DL (ref 70–110)

## 2023-04-29 PROCEDURE — 99284 EMERGENCY DEPT VISIT MOD MDM: CPT | Mod: ER

## 2023-04-29 PROCEDURE — 82962 GLUCOSE BLOOD TEST: CPT | Mod: ER

## 2023-04-29 PROCEDURE — 96372 THER/PROPH/DIAG INJ SC/IM: CPT | Performed by: EMERGENCY MEDICINE

## 2023-04-29 PROCEDURE — 25000003 PHARM REV CODE 250: Mod: ER | Performed by: EMERGENCY MEDICINE

## 2023-04-29 PROCEDURE — 81025 URINE PREGNANCY TEST: CPT | Mod: ER | Performed by: EMERGENCY MEDICINE

## 2023-04-29 RX ORDER — OXYCODONE AND ACETAMINOPHEN 5; 325 MG/1; MG/1
1 TABLET ORAL EVERY 6 HOURS PRN
Qty: 8 TABLET | Refills: 0 | Status: SHIPPED | OUTPATIENT
Start: 2023-04-29 | End: 2024-02-14 | Stop reason: ALTCHOICE

## 2023-04-29 RX ORDER — CLINDAMYCIN PHOSPHATE 150 MG/ML
600 INJECTION, SOLUTION INTRAVENOUS
Status: COMPLETED | OUTPATIENT
Start: 2023-04-29 | End: 2023-04-29

## 2023-04-29 RX ORDER — CLINDAMYCIN HYDROCHLORIDE 150 MG/1
300 CAPSULE ORAL EVERY 8 HOURS
Qty: 60 CAPSULE | Refills: 0 | Status: SHIPPED | OUTPATIENT
Start: 2023-04-29 | End: 2023-05-09

## 2023-04-29 RX ORDER — IBUPROFEN 600 MG/1
600 TABLET ORAL EVERY 6 HOURS PRN
Qty: 20 TABLET | Refills: 0 | Status: SHIPPED | OUTPATIENT
Start: 2023-04-29

## 2023-04-29 RX ORDER — MUPIROCIN 20 MG/G
OINTMENT TOPICAL 3 TIMES DAILY
Qty: 60 G | Refills: 0 | Status: SHIPPED | OUTPATIENT
Start: 2023-04-29 | End: 2023-05-09

## 2023-04-29 RX ORDER — ACETAMINOPHEN 500 MG
500 TABLET ORAL EVERY 6 HOURS PRN
Qty: 30 TABLET | Refills: 0 | Status: SHIPPED | OUTPATIENT
Start: 2023-04-29

## 2023-04-29 RX ADMIN — CLINDAMYCIN PHOSPHATE 600 MG: 150 INJECTION, SOLUTION INTRAVENOUS at 10:04

## 2023-04-29 NOTE — Clinical Note
"Jacinda Mejiaamira Hwang was seen and treated in our emergency department on 4/29/2023.  She may return to work on 05/01/2023.       If you have any questions or concerns, please don't hesitate to call.      Dipika Garnica RN"

## 2023-04-29 NOTE — ED PROVIDER NOTES
Encounter Date: 2023    SCRIBE #1 NOTE: I, Marilyn Strange, am scribing for, and in the presence of,  Yuliet Bernard DO. I have scribed the following portions of the note - Other sections scribed: HPI, ROS, PE.     History     Chief Complaint   Patient presents with    Insect Bite     Pt reports possible spider bite to L buttocks on Monday, reports purulent drainage, denies fever/chills      Jacinda Hwang is a 37 y.o. female with Hx of HTN and DM, who presents to the ED for chief complaint of a possible insect bite to the left buttock onset 5 days ago. Patient reports purulent drainage. Patient states that the area is firm and painful. Patient denies fever, chills, or other associated symptoms. Patient has NKDA.    The history is provided by the patient. No  was used.   Review of patient's allergies indicates:  No Known Allergies  Past Medical History:   Diagnosis Date    Diabetes mellitus     Hypertension      Past Surgical History:   Procedure Laterality Date     SECTION, CLASSIC      x1    SALPINGOOPHORECTOMY  2020    left ovary and tube    TUBAL LIGATION       History reviewed. No pertinent family history.  Social History     Tobacco Use    Smoking status: Never    Smokeless tobacco: Never   Substance Use Topics    Alcohol use: Yes     Alcohol/week: 1.0 standard drink     Types: 1 Glasses of wine per week     Comment: social    Drug use: No     Review of Systems   Constitutional:  Negative for chills and fever.   HENT:  Negative for rhinorrhea and sore throat.    Eyes:  Negative for redness.   Respiratory:  Negative for shortness of breath.    Cardiovascular:  Negative for chest pain and leg swelling.   Gastrointestinal:  Negative for abdominal pain, diarrhea, nausea and vomiting.   Musculoskeletal:  Positive for myalgias (left buttock). Negative for back pain.   Skin:  Positive for wound. Negative for rash.        (+) Drainage   Neurological:  Negative for syncope and headaches.    All other systems reviewed and are negative.    Physical Exam     Initial Vitals [04/29/23 0851]   BP Pulse Resp Temp SpO2   (!) 147/82 91 16 98.1 °F (36.7 °C) 99 %      MAP       --         Patient gave consent to have physical exam performed.   Physical Exam    Nursing note and vitals reviewed.  Constitutional: She appears well-developed and well-nourished.   HENT:   Head: Normocephalic and atraumatic.   Right Ear: External ear normal.   Left Ear: External ear normal.   Nose: Nose normal.   Mouth/Throat: Oropharynx is clear and moist.   Eyes: Conjunctivae and EOM are normal. Pupils are equal, round, and reactive to light.   Neck: Phonation normal. Neck supple.   Normal range of motion.  Cardiovascular:  Normal rate, regular rhythm, normal heart sounds and intact distal pulses.     Exam reveals no gallop and no friction rub.       No murmur heard.  Pulmonary/Chest: Effort normal and breath sounds normal. No stridor. No respiratory distress. She has no wheezes. She has no rhonchi. She has no rales. She exhibits no tenderness.   Abdominal: Abdomen is soft. Bowel sounds are normal. She exhibits no distension. There is no abdominal tenderness. There is no rigidity, no rebound and no guarding.   Musculoskeletal:         General: No tenderness or edema. Normal range of motion.      Cervical back: Normal range of motion and neck supple.     Neurological: She is alert and oriented to person, place, and time. She has normal strength. No cranial nerve deficit or sensory deficit. GCS score is 15. GCS eye subscore is 4. GCS verbal subscore is 5. GCS motor subscore is 6.   Skin: Skin is warm and dry. Capillary refill takes less than 2 seconds. No rash noted. There is erythema.   3 x 2 cm area of erythema with an open wound and drainage noted. There was no fluctuance on exam.   Psychiatric: She has a normal mood and affect. Her behavior is normal.       ED Course   Procedures  Labs Reviewed   POCT URINE PREGNANCY   POCT  GLUCOSE, HAND-HELD DEVICE   POCT GLUCOSE          Imaging Results    None          Medications   clindamycin injection 600 mg (600 mg Intramuscular Given 4/29/23 1019)     Medical Decision Making:   History:   Old Medical Records: I decided to obtain old medical records.  Clinical Tests:   Lab Tests: Ordered and Reviewed  The following lab test(s) were unremarkable: UPT        This is an evaluation of a 37 y.o. female that presents to the Emergency Department for   Chief Complaint   Patient presents with    Insect Bite     Pt reports possible spider bite to L buttocks on Monday, reports purulent drainage, denies fever/chills        The patient is a non-toxic and well appearing patient. On physical exam, patient appears well hydrated with moist mucus membranes. Breath sounds are clear and equal bilaterally with no adventitious breath sounds, tachypnea or respiratory distress. Regular rate and rhythm. No murmurs. Abdomen soft and non tender. Patient is tolerating PO without difficulty.  Vital Signs Are Reassuring.     Differential diagnosis: Hyperglycemia, cellulitis, abscess, and insect bite.    ED Course:Treatment in the ED included Physical Exam and medications given in ED  Medications   clindamycin injection 600 mg (600 mg Intramuscular Given 4/29/23 1019)   .   Patient reports feeling better after treatment in the ER.     Labs Reviewed      Admission on 04/29/2023   Component Date Value Ref Range Status    POC Preg Test, Ur 04/29/2023 Negative  Negative Final     Acceptable 04/29/2023 Yes   Final    POCT Glucose 04/29/2023 84  70 - 110 mg/dL Final        In shared decision making with the patient, we discussed treatment, prescriptions, labs, and follow up.    Discharge home with   ED Prescriptions       Medication Sig Dispense Start Date End Date Auth. Provider    ibuprofen (ADVIL,MOTRIN) 600 MG tablet Take 1 tablet (600 mg total) by mouth every 6 (six) hours as needed for Pain (Take with food as  needed for mild-to-moderate pain). 20 tablet 4/29/2023 -- Yuliet Bernard DO    acetaminophen (TYLENOL) 500 MG tablet Take 1 tablet (500 mg total) by mouth every 6 (six) hours as needed for Pain. 30 tablet 4/29/2023 -- Yuliet Bernard DO    clindamycin (CLEOCIN) 150 MG capsule Take 2 capsules (300 mg total) by mouth every 8 (eight) hours. for 10 days 60 capsule 4/29/2023 5/9/2023 Yuliet Bernard DO    oxyCODONE-acetaminophen (PERCOCET) 5-325 mg per tablet Take 1 tablet by mouth every 6 (six) hours as needed for Pain (As needed for 10/10 pain). 8 tablet 4/29/2023 -- Yuliet Bernard DO    mupirocin (BACTROBAN) 2 % ointment Apply topically 3 (three) times daily. for 10 days 60 g 4/29/2023 5/9/2023 Yuliet Bernard DO          Fill and take prescriptions as directed.  Return to the ED if symptoms worsen or do not resolve.   Answered questions and discussed discharge plan.    Patient feels better and is ready for discharge.  Follow up with PCP/specialist in 1 day     Scribe Attestation:   Scribe #1: I performed the above scribed service and the documentation accurately describes the services I performed. I attest to the accuracy of the note.                  I, Dr. Yuliet Bernard, personally performed the services described in this documentation. This document was produced by a scribe under my direction and in my presence. All medical record entries made by the scribe were at my direction and in my presence.  I have reviewed the chart and agree that the record reflects my personal performance and is accurate and complete. Yuliet Bernard DO.     04/29/2023 10:52 AM    Clinical Impression:   Final diagnoses:  [L03.317] Cellulitis of buttock (Primary)        ED Disposition Condition    Discharge Stable          ED Prescriptions       Medication Sig Dispense Start Date End Date Auth. Provider    ibuprofen (ADVIL,MOTRIN) 600 MG tablet Take 1 tablet (600 mg total) by mouth every 6 (six) hours as needed for Pain (Take with food as  needed for mild-to-moderate pain). 20 tablet 4/29/2023 -- Yuliet Bernard, DO    acetaminophen (TYLENOL) 500 MG tablet Take 1 tablet (500 mg total) by mouth every 6 (six) hours as needed for Pain. 30 tablet 4/29/2023 -- Yuliet Bernard, DO    clindamycin (CLEOCIN) 150 MG capsule Take 2 capsules (300 mg total) by mouth every 8 (eight) hours. for 10 days 60 capsule 4/29/2023 5/9/2023 Yuliet Bernard DO    oxyCODONE-acetaminophen (PERCOCET) 5-325 mg per tablet Take 1 tablet by mouth every 6 (six) hours as needed for Pain (As needed for 10/10 pain). 8 tablet 4/29/2023 -- Yuliet Bernard, DO    mupirocin (BACTROBAN) 2 % ointment Apply topically 3 (three) times daily. for 10 days 60 g 4/29/2023 5/9/2023 Yuliet Bernard DO          Follow-up Information       Follow up With Specialties Details Why Contact Tonja Ventura MD Obstetrics and Gynecology, Obstetrics, Gynecology Schedule an appointment as soon as possible for a visit in 2 days  [address] [facility] [address] [address]  [address]  [facility]  [facility] [address] [phone]      [facility] - Emergency Dept Emergency Medicine Go to  Please go to [facility] emergency department if symptoms worsen [address] [place] [id number] [phone]             Yuliet Bernard DO  04/29/23 3222

## 2024-02-14 ENCOUNTER — LAB VISIT (OUTPATIENT)
Dept: LAB | Facility: HOSPITAL | Age: 39
End: 2024-02-14
Attending: INTERNAL MEDICINE
Payer: COMMERCIAL

## 2024-02-14 ENCOUNTER — OFFICE VISIT (OUTPATIENT)
Dept: FAMILY MEDICINE | Facility: CLINIC | Age: 39
End: 2024-02-14
Payer: COMMERCIAL

## 2024-02-14 VITALS
HEIGHT: 65 IN | HEART RATE: 100 BPM | DIASTOLIC BLOOD PRESSURE: 82 MMHG | SYSTOLIC BLOOD PRESSURE: 126 MMHG | BODY MASS INDEX: 41.91 KG/M2 | OXYGEN SATURATION: 98 % | WEIGHT: 251.56 LBS | TEMPERATURE: 98 F

## 2024-02-14 DIAGNOSIS — E11.65 TYPE 2 DIABETES MELLITUS WITH HYPERGLYCEMIA, WITHOUT LONG-TERM CURRENT USE OF INSULIN: ICD-10-CM

## 2024-02-14 DIAGNOSIS — I10 HYPERTENSION, ESSENTIAL: Primary | ICD-10-CM

## 2024-02-14 DIAGNOSIS — I10 HYPERTENSION, ESSENTIAL: ICD-10-CM

## 2024-02-14 DIAGNOSIS — D50.0 IRON DEFICIENCY ANEMIA DUE TO CHRONIC BLOOD LOSS: ICD-10-CM

## 2024-02-14 DIAGNOSIS — Z11.59 NEED FOR HEPATITIS C SCREENING TEST: ICD-10-CM

## 2024-02-14 LAB
ALBUMIN SERPL BCP-MCNC: 2.9 G/DL (ref 3.5–5.2)
ALP SERPL-CCNC: 79 U/L (ref 55–135)
ALT SERPL W/O P-5'-P-CCNC: 40 U/L (ref 10–44)
ANION GAP SERPL CALC-SCNC: 5 MMOL/L (ref 8–16)
AST SERPL-CCNC: 19 U/L (ref 10–40)
BASOPHILS # BLD AUTO: 0.01 K/UL (ref 0–0.2)
BASOPHILS NFR BLD: 0.2 % (ref 0–1.9)
BILIRUB SERPL-MCNC: 0.4 MG/DL (ref 0.1–1)
BUN SERPL-MCNC: 9 MG/DL (ref 6–20)
CALCIUM SERPL-MCNC: 8.7 MG/DL (ref 8.7–10.5)
CHLORIDE SERPL-SCNC: 108 MMOL/L (ref 95–110)
CHOLEST SERPL-MCNC: 129 MG/DL (ref 120–199)
CHOLEST/HDLC SERPL: 3.7 {RATIO} (ref 2–5)
CO2 SERPL-SCNC: 25 MMOL/L (ref 23–29)
CREAT SERPL-MCNC: 0.6 MG/DL (ref 0.5–1.4)
DIFFERENTIAL METHOD BLD: ABNORMAL
EOSINOPHIL # BLD AUTO: 0.1 K/UL (ref 0–0.5)
EOSINOPHIL NFR BLD: 2 % (ref 0–8)
ERYTHROCYTE [DISTWIDTH] IN BLOOD BY AUTOMATED COUNT: 13.2 % (ref 11.5–14.5)
EST. GFR  (NO RACE VARIABLE): >60 ML/MIN/1.73 M^2
FERRITIN SERPL-MCNC: 77 NG/ML (ref 20–300)
GLUCOSE SERPL-MCNC: 94 MG/DL (ref 70–110)
HCT VFR BLD AUTO: 36.3 % (ref 37–48.5)
HDLC SERPL-MCNC: 35 MG/DL (ref 40–75)
HDLC SERPL: 27.1 % (ref 20–50)
HGB BLD-MCNC: 11.7 G/DL (ref 12–16)
IMM GRANULOCYTES # BLD AUTO: 0.01 K/UL (ref 0–0.04)
IMM GRANULOCYTES NFR BLD AUTO: 0.2 % (ref 0–0.5)
IRON SERPL-MCNC: 76 UG/DL (ref 30–160)
LDLC SERPL CALC-MCNC: 83 MG/DL (ref 63–159)
LYMPHOCYTES # BLD AUTO: 1.7 K/UL (ref 1–4.8)
LYMPHOCYTES NFR BLD: 30.6 % (ref 18–48)
MCH RBC QN AUTO: 25.5 PG (ref 27–31)
MCHC RBC AUTO-ENTMCNC: 32.2 G/DL (ref 32–36)
MCV RBC AUTO: 79 FL (ref 82–98)
MONOCYTES # BLD AUTO: 0.5 K/UL (ref 0.3–1)
MONOCYTES NFR BLD: 8.6 % (ref 4–15)
NEUTROPHILS # BLD AUTO: 3.2 K/UL (ref 1.8–7.7)
NEUTROPHILS NFR BLD: 58.4 % (ref 38–73)
NONHDLC SERPL-MCNC: 94 MG/DL
NRBC BLD-RTO: 0 /100 WBC
PLATELET # BLD AUTO: 230 K/UL (ref 150–450)
PMV BLD AUTO: 10.6 FL (ref 9.2–12.9)
POTASSIUM SERPL-SCNC: 3.5 MMOL/L (ref 3.5–5.1)
PROT SERPL-MCNC: 7 G/DL (ref 6–8.4)
RBC # BLD AUTO: 4.59 M/UL (ref 4–5.4)
SATURATED IRON: 23 % (ref 20–50)
SODIUM SERPL-SCNC: 138 MMOL/L (ref 136–145)
TOTAL IRON BINDING CAPACITY: 327 UG/DL (ref 250–450)
TRANSFERRIN SERPL-MCNC: 221 MG/DL (ref 200–375)
TRIGL SERPL-MCNC: 55 MG/DL (ref 30–150)
WBC # BLD AUTO: 5.46 K/UL (ref 3.9–12.7)

## 2024-02-14 PROCEDURE — 80053 COMPREHEN METABOLIC PANEL: CPT | Performed by: INTERNAL MEDICINE

## 2024-02-14 PROCEDURE — 1160F RVW MEDS BY RX/DR IN RCRD: CPT | Mod: CPTII,S$GLB,, | Performed by: INTERNAL MEDICINE

## 2024-02-14 PROCEDURE — 3079F DIAST BP 80-89 MM HG: CPT | Mod: CPTII,S$GLB,, | Performed by: INTERNAL MEDICINE

## 2024-02-14 PROCEDURE — 83540 ASSAY OF IRON: CPT | Performed by: INTERNAL MEDICINE

## 2024-02-14 PROCEDURE — 80061 LIPID PANEL: CPT | Performed by: INTERNAL MEDICINE

## 2024-02-14 PROCEDURE — 82728 ASSAY OF FERRITIN: CPT | Performed by: INTERNAL MEDICINE

## 2024-02-14 PROCEDURE — 99999 PR PBB SHADOW E&M-EST. PATIENT-LVL IV: CPT | Mod: PBBFAC,,, | Performed by: INTERNAL MEDICINE

## 2024-02-14 PROCEDURE — 83036 HEMOGLOBIN GLYCOSYLATED A1C: CPT | Performed by: INTERNAL MEDICINE

## 2024-02-14 PROCEDURE — 3008F BODY MASS INDEX DOCD: CPT | Mod: CPTII,S$GLB,, | Performed by: INTERNAL MEDICINE

## 2024-02-14 PROCEDURE — 3074F SYST BP LT 130 MM HG: CPT | Mod: CPTII,S$GLB,, | Performed by: INTERNAL MEDICINE

## 2024-02-14 PROCEDURE — 85025 COMPLETE CBC W/AUTO DIFF WBC: CPT | Performed by: INTERNAL MEDICINE

## 2024-02-14 PROCEDURE — 86803 HEPATITIS C AB TEST: CPT | Performed by: INTERNAL MEDICINE

## 2024-02-14 PROCEDURE — 1159F MED LIST DOCD IN RCRD: CPT | Mod: CPTII,S$GLB,, | Performed by: INTERNAL MEDICINE

## 2024-02-14 PROCEDURE — 36415 COLL VENOUS BLD VENIPUNCTURE: CPT | Mod: PN | Performed by: INTERNAL MEDICINE

## 2024-02-14 PROCEDURE — 99203 OFFICE O/P NEW LOW 30 MIN: CPT | Mod: S$GLB,,, | Performed by: INTERNAL MEDICINE

## 2024-02-14 RX ORDER — ATORVASTATIN CALCIUM 20 MG/1
20 TABLET, FILM COATED ORAL NIGHTLY
COMMUNITY
Start: 2023-11-02

## 2024-02-14 NOTE — PROGRESS NOTES
"Subjective:       Patient ID: Jacinda Hwang is a 38 y.o. female.    Chief Complaint: Establish Care    Est pcp    HPI: 39 y/o w/ HTN DM presents alone to establish primary care. Diagnosed with gestational diabetes eight years ago remained on medication after that pregnancy until now. She had issues with loose stool with even low dose ofmetformin and therefore was prescribed liraglutide daily injections (had also been on semaglutide but switched due to insurance coverage) no constipation occasional nausea does feel estrada faster no dysphagia no LE swelling has not had eye exam in more than three years no parathesia of lower extremities    SH:  with two daughters lives with  works as  non smoker social EtOH    PMHx: history of iron deficiency anemia on oral iron supplement stil having monthly menses      Review of Systems   Constitutional:  Negative for activity change, appetite change, fatigue, fever and unexpected weight change.   HENT:  Negative for ear pain, rhinorrhea and sore throat.    Eyes:  Negative for discharge and visual disturbance.   Respiratory:  Negative for chest tightness, shortness of breath and wheezing.    Cardiovascular:  Negative for chest pain, palpitations and leg swelling.   Gastrointestinal:  Negative for abdominal pain, constipation and diarrhea.   Endocrine: Negative for cold intolerance and heat intolerance.   Genitourinary:  Negative for dysuria and hematuria.   Musculoskeletal:  Negative for joint swelling and neck stiffness.   Skin:  Negative for rash.   Neurological:  Negative for dizziness, syncope, weakness and headaches.   Psychiatric/Behavioral:  Negative for suicidal ideas.        Objective:     Vitals:    02/14/24 0945   BP: 126/82   BP Location: Left arm   Patient Position: Sitting   BP Method: Large (Manual)   Pulse: 100   Temp: 98.3 °F (36.8 °C)   TempSrc: Oral   SpO2: 98%   Weight: 114.1 kg (251 lb 8.7 oz)   Height: 5' 5" (1.651 m)        "   Physical Exam  Constitutional:       Appearance: She is well-developed.   HENT:      Head: Normocephalic and atraumatic.   Eyes:      General: No scleral icterus.     Conjunctiva/sclera: Conjunctivae normal.   Cardiovascular:      Rate and Rhythm: Normal rate and regular rhythm.      Heart sounds: No murmur heard.     No friction rub. No gallop.   Pulmonary:      Effort: Pulmonary effort is normal.      Breath sounds: Normal breath sounds. No wheezing or rales.   Abdominal:      Palpations: Abdomen is soft.      Tenderness: There is no abdominal tenderness. There is no right CVA tenderness, left CVA tenderness, guarding or rebound.   Musculoskeletal:         General: No tenderness. Normal range of motion.      Cervical back: Normal range of motion.      Right lower leg: No edema.      Left lower leg: No edema.   Skin:     General: Skin is warm and dry.      Comments: Protective Sensation (w/ 10 gram monofilament):  Right: Intact  Left: Intact    Visual Inspection:  Normal -  Bilateral  Dry skin without callous or fissures no charcot changes to food structure  Pedal Pulses:   Right: Present  Left: Present    Posterior Tibialis Pulses:   Right:Present  Left: Present       Neurological:      Mental Status: She is alert and oriented to person, place, and time.      Cranial Nerves: No cranial nerve deficit.         Assessment and Plan   1. Hypertension, essential  Bp at goal on arb and thiazide check electrolytes and renal function  - CBC Auto Differential; Future  - Comprehensive Metabolic Panel; Future  - Comprehensive Metabolic Panel; Future    2. Type 2 diabetes mellitus with hyperglycemia, without long-term current use of insulin  Reports last A1c >  6months ago was 6.7% repeat labs today on statin referral for retinopathy screening  - CBC Auto Differential; Future  - Comprehensive Metabolic Panel; Future  - Hemoglobin A1C; Future  - Lipid Panel; Future  - liraglutide 0.6 mg/0.1 mL, 18 mg/3 mL, subq PNIJ (VICTOZA  2-JODIE) 0.6 mg/0.1 mL (18 mg/3 mL) PnIj pen; Inject 1.8 mg into the skin once daily.  Dispense: 12 mL; Refill: 3  - Hemoglobin A1C; Future  - Ambulatory referral/consult to Optometry; Future    3. Need for hepatitis C screening test  Hep c ab  - Hepatitis C Antibody; Future    4. Iron deficiency anemia due to chronic blood loss  On daily iron supplmeent suspect related to menses check iron stores and blood count  - CBC Auto Differential; Future  - Ferritin; Future  - IRON AND TIBC; Future

## 2024-02-15 DIAGNOSIS — Z12.31 OTHER SCREENING MAMMOGRAM: ICD-10-CM

## 2024-02-15 LAB
ESTIMATED AVG GLUCOSE: 137 MG/DL (ref 68–131)
HBA1C MFR BLD: 6.4 % (ref 4–5.6)
HCV AB SERPL QL IA: NORMAL

## 2024-02-21 ENCOUNTER — OFFICE VISIT (OUTPATIENT)
Dept: OPTOMETRY | Facility: CLINIC | Age: 39
End: 2024-02-21
Payer: COMMERCIAL

## 2024-02-21 DIAGNOSIS — E11.65 TYPE 2 DIABETES MELLITUS WITH HYPERGLYCEMIA, WITHOUT LONG-TERM CURRENT USE OF INSULIN: Primary | ICD-10-CM

## 2024-02-21 PROCEDURE — 3044F HG A1C LEVEL LT 7.0%: CPT | Mod: CPTII,S$GLB,, | Performed by: OPTOMETRIST

## 2024-02-21 PROCEDURE — 99999 PR PBB SHADOW E&M-EST. PATIENT-LVL III: CPT | Mod: PBBFAC,,, | Performed by: OPTOMETRIST

## 2024-02-21 PROCEDURE — 92004 COMPRE OPH EXAM NEW PT 1/>: CPT | Mod: S$GLB,,, | Performed by: OPTOMETRIST

## 2024-02-21 PROCEDURE — 1159F MED LIST DOCD IN RCRD: CPT | Mod: CPTII,S$GLB,, | Performed by: OPTOMETRIST

## 2024-02-21 PROCEDURE — 2023F DILAT RTA XM W/O RTNOPTHY: CPT | Mod: CPTII,S$GLB,, | Performed by: OPTOMETRIST

## 2024-02-21 PROCEDURE — 1160F RVW MEDS BY RX/DR IN RCRD: CPT | Mod: CPTII,S$GLB,, | Performed by: OPTOMETRIST

## 2024-02-21 NOTE — PROGRESS NOTES
Subjective:       Patient ID: Jacinda Hwang is a 38 y.o. female      Chief Complaint   Patient presents with    Concerns About Ocular Health    Diabetic Eye Exam     History of Present Illness   Dls 2-3 yrs     39 y/o female presents today for diabetic eye exam.   Pt c/o blurry vision at distance ou off/on. Does not wear any glasses    LBS ?     No tearing  No itching  No burning  No pain  No ha's  No floaters  No flashes    Eye meds  None    Pohx:   None    Fohx:   None    Hemoglobin A1C       Date                     Value               Ref Range             Status                02/14/2024               6.4 (H)             4.0 - 5.6 %           Final                 Assessment/Plan:     1. Type 2 diabetes mellitus with hyperglycemia, without long-term current use of insulin  No diabetic retinopathy. Discussed with pt the effects of diabetes on vision, importance of good blood sugar control, compliance with meds, and follow up care with PCP. Return in 1 year for dilated eye exam, sooner PRN.    - Ambulatory referral/consult to Optometry    Follow up in about 1 year (around 2/21/2025) for Diabetic Eye Exam.

## 2024-02-28 DIAGNOSIS — E11.9 TYPE 2 DIABETES MELLITUS WITHOUT COMPLICATION: ICD-10-CM

## 2024-04-13 ENCOUNTER — CLINICAL SUPPORT (OUTPATIENT)
Dept: OTHER | Facility: CLINIC | Age: 39
End: 2024-04-13

## 2024-04-13 DIAGNOSIS — Z00.8 ENCOUNTER FOR OTHER GENERAL EXAMINATION: ICD-10-CM

## 2024-04-16 VITALS
SYSTOLIC BLOOD PRESSURE: 162 MMHG | WEIGHT: 240 LBS | HEIGHT: 66 IN | DIASTOLIC BLOOD PRESSURE: 90 MMHG | BODY MASS INDEX: 38.57 KG/M2

## 2024-04-16 LAB
HBA1C MFR BLD: 5.7 %
HDLC SERPL-MCNC: 37 MG/DL
POC CHOLESTEROL, LDL (DOCK): 56 MG/DL
POC CHOLESTEROL, TOTAL: 112 MG/DL
POC GLUCOSE, FASTING: 109 MG/DL (ref 60–110)
TRIGL SERPL-MCNC: 99 MG/DL

## 2024-05-13 ENCOUNTER — PATIENT MESSAGE (OUTPATIENT)
Dept: ADMINISTRATIVE | Facility: HOSPITAL | Age: 39
End: 2024-05-13
Payer: COMMERCIAL

## 2024-05-13 ENCOUNTER — PATIENT OUTREACH (OUTPATIENT)
Dept: ADMINISTRATIVE | Facility: HOSPITAL | Age: 39
End: 2024-05-13
Payer: COMMERCIAL

## 2024-05-13 NOTE — PROGRESS NOTES
Lab linked to appointment scheduled for 05/14/24. Portal message sent with order so pt can schedule her mammogram. Immunization's updated/triggered.

## 2024-05-14 ENCOUNTER — LAB VISIT (OUTPATIENT)
Dept: LAB | Facility: HOSPITAL | Age: 39
End: 2024-05-14
Attending: INTERNAL MEDICINE
Payer: COMMERCIAL

## 2024-05-14 DIAGNOSIS — I10 HYPERTENSION, ESSENTIAL: ICD-10-CM

## 2024-05-14 DIAGNOSIS — E11.65 TYPE 2 DIABETES MELLITUS WITH HYPERGLYCEMIA, WITHOUT LONG-TERM CURRENT USE OF INSULIN: ICD-10-CM

## 2024-05-14 LAB
ALBUMIN SERPL BCP-MCNC: 3.1 G/DL (ref 3.5–5.2)
ALP SERPL-CCNC: 110 U/L (ref 55–135)
ALT SERPL W/O P-5'-P-CCNC: 54 U/L (ref 10–44)
ANION GAP SERPL CALC-SCNC: 6 MMOL/L (ref 8–16)
AST SERPL-CCNC: 34 U/L (ref 10–40)
BILIRUB SERPL-MCNC: 0.4 MG/DL (ref 0.1–1)
BUN SERPL-MCNC: 15 MG/DL (ref 6–20)
CALCIUM SERPL-MCNC: 10.1 MG/DL (ref 8.7–10.5)
CHLORIDE SERPL-SCNC: 104 MMOL/L (ref 95–110)
CO2 SERPL-SCNC: 28 MMOL/L (ref 23–29)
CREAT SERPL-MCNC: 0.6 MG/DL (ref 0.5–1.4)
EST. GFR  (NO RACE VARIABLE): >60 ML/MIN/1.73 M^2
ESTIMATED AVG GLUCOSE: 123 MG/DL (ref 68–131)
GLUCOSE SERPL-MCNC: 138 MG/DL (ref 70–110)
HBA1C MFR BLD: 5.9 % (ref 4–5.6)
POTASSIUM SERPL-SCNC: 3.6 MMOL/L (ref 3.5–5.1)
PROT SERPL-MCNC: 7.4 G/DL (ref 6–8.4)
SODIUM SERPL-SCNC: 138 MMOL/L (ref 136–145)

## 2024-05-14 PROCEDURE — 83036 HEMOGLOBIN GLYCOSYLATED A1C: CPT | Performed by: INTERNAL MEDICINE

## 2024-05-14 PROCEDURE — 36415 COLL VENOUS BLD VENIPUNCTURE: CPT | Mod: PN | Performed by: INTERNAL MEDICINE

## 2024-05-14 PROCEDURE — 80053 COMPREHEN METABOLIC PANEL: CPT | Performed by: INTERNAL MEDICINE

## 2024-05-28 ENCOUNTER — OFFICE VISIT (OUTPATIENT)
Dept: FAMILY MEDICINE | Facility: CLINIC | Age: 39
End: 2024-05-28
Payer: COMMERCIAL

## 2024-05-28 VITALS
HEIGHT: 66 IN | DIASTOLIC BLOOD PRESSURE: 74 MMHG | OXYGEN SATURATION: 97 % | TEMPERATURE: 98 F | WEIGHT: 228.63 LBS | BODY MASS INDEX: 36.74 KG/M2 | HEART RATE: 111 BPM | SYSTOLIC BLOOD PRESSURE: 124 MMHG

## 2024-05-28 DIAGNOSIS — I10 HYPERTENSION, ESSENTIAL: Primary | ICD-10-CM

## 2024-05-28 DIAGNOSIS — M65.832 EXTENSOR TENOSYNOVITIS OF WRIST, LEFT: ICD-10-CM

## 2024-05-28 DIAGNOSIS — E11.65 TYPE 2 DIABETES MELLITUS WITH HYPERGLYCEMIA, WITHOUT LONG-TERM CURRENT USE OF INSULIN: ICD-10-CM

## 2024-05-28 PROCEDURE — 3066F NEPHROPATHY DOC TX: CPT | Mod: CPTII,S$GLB,, | Performed by: INTERNAL MEDICINE

## 2024-05-28 PROCEDURE — 3008F BODY MASS INDEX DOCD: CPT | Mod: CPTII,S$GLB,, | Performed by: INTERNAL MEDICINE

## 2024-05-28 PROCEDURE — 3074F SYST BP LT 130 MM HG: CPT | Mod: CPTII,S$GLB,, | Performed by: INTERNAL MEDICINE

## 2024-05-28 PROCEDURE — 3061F NEG MICROALBUMINURIA REV: CPT | Mod: CPTII,S$GLB,, | Performed by: INTERNAL MEDICINE

## 2024-05-28 PROCEDURE — 99999 PR PBB SHADOW E&M-EST. PATIENT-LVL IV: CPT | Mod: PBBFAC,,, | Performed by: INTERNAL MEDICINE

## 2024-05-28 PROCEDURE — 1160F RVW MEDS BY RX/DR IN RCRD: CPT | Mod: CPTII,S$GLB,, | Performed by: INTERNAL MEDICINE

## 2024-05-28 PROCEDURE — 1159F MED LIST DOCD IN RCRD: CPT | Mod: CPTII,S$GLB,, | Performed by: INTERNAL MEDICINE

## 2024-05-28 PROCEDURE — 3078F DIAST BP <80 MM HG: CPT | Mod: CPTII,S$GLB,, | Performed by: INTERNAL MEDICINE

## 2024-05-28 PROCEDURE — 99214 OFFICE O/P EST MOD 30 MIN: CPT | Mod: S$GLB,,, | Performed by: INTERNAL MEDICINE

## 2024-05-28 PROCEDURE — 3044F HG A1C LEVEL LT 7.0%: CPT | Mod: CPTII,S$GLB,, | Performed by: INTERNAL MEDICINE

## 2024-05-28 RX ORDER — LOSARTAN POTASSIUM AND HYDROCHLOROTHIAZIDE 12.5; 5 MG/1; MG/1
1 TABLET ORAL DAILY
Qty: 90 TABLET | Refills: 1 | Status: SHIPPED | OUTPATIENT
Start: 2024-05-28

## 2024-05-28 NOTE — PROGRESS NOTES
"Subjective:       Patient ID: Jacinda Hwang is a 38 y.o. female.    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   HENT:  Negative for ear pain, rhinorrhea and sore throat.    Eyes:  Negative for discharge and visual disturbance.   Respiratory:  Negative for chest tightness, shortness of breath and wheezing.    Cardiovascular:  Negative for chest pain, palpitations and leg swelling.   Gastrointestinal:  Negative for abdominal pain, constipation and diarrhea.   Endocrine: Negative for cold intolerance and heat intolerance.   Genitourinary:  Negative for dysuria and hematuria.   Musculoskeletal:  Positive for arthralgias. Negative for joint swelling and neck stiffness.   Skin:  Negative for rash.   Neurological:  Negative for dizziness, syncope, weakness and headaches.   Psychiatric/Behavioral:  Negative for suicidal ideas.        Objective:     Vitals:    05/28/24 1005   BP: 124/74   BP Location: Right arm   Patient Position: Sitting   BP Method: Large (Manual)   Pulse: (!) 111   Temp: 98.4 °F (36.9 °C)   TempSrc: Oral   SpO2: 97%   Weight: 103.7 kg (228 lb 9.9 oz)   Height: 5' 6" (1.676 m)          Physical Exam  Constitutional:       Appearance: She is well-developed.   HENT:      Head: Normocephalic and " atraumatic.   Eyes:      General: No scleral icterus.     Conjunctiva/sclera: Conjunctivae normal.   Cardiovascular:      Rate and Rhythm: Normal rate and regular rhythm.      Heart sounds: No murmur heard.     No friction rub. No gallop.   Pulmonary:      Effort: Pulmonary effort is normal.      Breath sounds: Normal breath sounds. No wheezing or rales.   Abdominal:      Palpations: Abdomen is soft.      Tenderness: There is no abdominal tenderness. There is no guarding or rebound.   Musculoskeletal:         General: Normal range of motion.      Cervical back: Normal range of motion.      Comments: Left hand without synovitis no snuff box tenderness able to oppose each finger to thumb with good resistive strength    Skin:     General: Skin is warm and dry.   Neurological:      Mental Status: She is alert and oriented to person, place, and time.      Cranial Nerves: No cranial nerve deficit.         Assessment and Plan   1. Hypertension, essential  Bp at goal contineu current medicatiosn rpeeat electrolytes and renal function prior to return in four months  - Comprehensive Metabolic Panel; Future  - losartan-hydrochlorothiazide 50-12.5 mg (HYZAAR) 50-12.5 mg per tablet; Take 1 tablet by mouth once daily.  Dispense: 90 tablet; Refill: 1    2. Type 2 diabetes mellitus with hyperglycemia, without long-term current use of insulin  A1c at goal contineu liraglutide  - CBC Without Differential; Future  - Comprehensive Metabolic Panel; Future  - Hemoglobin A1C; Future  - liraglutide 0.6 mg/0.1 mL, 18 mg/3 mL, subq PNIJ (VICTOZA 2-JODIE) 0.6 mg/0.1 mL (18 mg/3 mL) PnIj pen; Inject 1.8 mg into the skin once daily.  Dispense: 12 mL; Refill: 3    3. Extensor tenosynovitis of wrist, left  Xray today referral to ortho for consideration of advanced imaging  - X-Ray Wrist Complete Left; Future  - Ambulatory referral/consult to Orthopedics; Future

## 2024-08-07 ENCOUNTER — TELEPHONE (OUTPATIENT)
Dept: PHARMACY | Facility: CLINIC | Age: 39
End: 2024-08-07
Payer: COMMERCIAL

## 2024-09-20 ENCOUNTER — PATIENT OUTREACH (OUTPATIENT)
Dept: ADMINISTRATIVE | Facility: HOSPITAL | Age: 39
End: 2024-09-20
Payer: COMMERCIAL

## 2024-09-20 DIAGNOSIS — E11.65 TYPE 2 DIABETES MELLITUS WITH HYPERGLYCEMIA, WITHOUT LONG-TERM CURRENT USE OF INSULIN: Primary | ICD-10-CM

## 2024-09-23 ENCOUNTER — LAB VISIT (OUTPATIENT)
Dept: LAB | Facility: HOSPITAL | Age: 39
End: 2024-09-23
Attending: INTERNAL MEDICINE
Payer: COMMERCIAL

## 2024-09-23 DIAGNOSIS — I10 HYPERTENSION, ESSENTIAL: ICD-10-CM

## 2024-09-23 DIAGNOSIS — E11.65 TYPE 2 DIABETES MELLITUS WITH HYPERGLYCEMIA, WITHOUT LONG-TERM CURRENT USE OF INSULIN: ICD-10-CM

## 2024-09-23 LAB
ALBUMIN SERPL BCP-MCNC: 2.9 G/DL (ref 3.5–5.2)
ALP SERPL-CCNC: 159 U/L (ref 55–135)
ALT SERPL W/O P-5'-P-CCNC: 33 U/L (ref 10–44)
ANION GAP SERPL CALC-SCNC: 8 MMOL/L (ref 8–16)
AST SERPL-CCNC: 23 U/L (ref 10–40)
BILIRUB SERPL-MCNC: 0.4 MG/DL (ref 0.1–1)
BUN SERPL-MCNC: 11 MG/DL (ref 6–20)
CALCIUM SERPL-MCNC: 9.7 MG/DL (ref 8.7–10.5)
CHLORIDE SERPL-SCNC: 108 MMOL/L (ref 95–110)
CO2 SERPL-SCNC: 23 MMOL/L (ref 23–29)
CREAT SERPL-MCNC: 0.6 MG/DL (ref 0.5–1.4)
ERYTHROCYTE [DISTWIDTH] IN BLOOD BY AUTOMATED COUNT: 14.5 % (ref 11.5–14.5)
EST. GFR  (NO RACE VARIABLE): >60 ML/MIN/1.73 M^2
ESTIMATED AVG GLUCOSE: 111 MG/DL (ref 68–131)
GLUCOSE SERPL-MCNC: 97 MG/DL (ref 70–110)
HBA1C MFR BLD: 5.5 % (ref 4–5.6)
HCT VFR BLD AUTO: 33.6 % (ref 37–48.5)
HGB BLD-MCNC: 10.4 G/DL (ref 12–16)
MCH RBC QN AUTO: 23.9 PG (ref 27–31)
MCHC RBC AUTO-ENTMCNC: 31 G/DL (ref 32–36)
MCV RBC AUTO: 77 FL (ref 82–98)
PLATELET # BLD AUTO: 242 K/UL (ref 150–450)
PMV BLD AUTO: 11 FL (ref 9.2–12.9)
POTASSIUM SERPL-SCNC: 3.6 MMOL/L (ref 3.5–5.1)
PROT SERPL-MCNC: 7 G/DL (ref 6–8.4)
RBC # BLD AUTO: 4.36 M/UL (ref 4–5.4)
SODIUM SERPL-SCNC: 139 MMOL/L (ref 136–145)
WBC # BLD AUTO: 5.5 K/UL (ref 3.9–12.7)

## 2024-09-23 PROCEDURE — 83036 HEMOGLOBIN GLYCOSYLATED A1C: CPT | Performed by: INTERNAL MEDICINE

## 2024-09-23 PROCEDURE — 85027 COMPLETE CBC AUTOMATED: CPT | Performed by: INTERNAL MEDICINE

## 2024-09-23 PROCEDURE — 80053 COMPREHEN METABOLIC PANEL: CPT | Performed by: INTERNAL MEDICINE

## 2024-10-22 ENCOUNTER — OFFICE VISIT (OUTPATIENT)
Dept: FAMILY MEDICINE | Facility: CLINIC | Age: 39
End: 2024-10-22
Payer: COMMERCIAL

## 2024-10-22 ENCOUNTER — TELEPHONE (OUTPATIENT)
Dept: FAMILY MEDICINE | Facility: CLINIC | Age: 39
End: 2024-10-22

## 2024-10-22 ENCOUNTER — LAB VISIT (OUTPATIENT)
Dept: LAB | Facility: HOSPITAL | Age: 39
End: 2024-10-22
Attending: INTERNAL MEDICINE
Payer: COMMERCIAL

## 2024-10-22 VITALS
BODY MASS INDEX: 35.61 KG/M2 | SYSTOLIC BLOOD PRESSURE: 130 MMHG | HEIGHT: 66 IN | WEIGHT: 221.56 LBS | HEART RATE: 93 BPM | DIASTOLIC BLOOD PRESSURE: 76 MMHG | OXYGEN SATURATION: 98 % | TEMPERATURE: 98 F

## 2024-10-22 DIAGNOSIS — I10 HYPERTENSION, ESSENTIAL: ICD-10-CM

## 2024-10-22 DIAGNOSIS — E11.65 TYPE 2 DIABETES MELLITUS WITH HYPERGLYCEMIA, WITHOUT LONG-TERM CURRENT USE OF INSULIN: ICD-10-CM

## 2024-10-22 DIAGNOSIS — E05.90 HYPERTHYROIDISM: Primary | ICD-10-CM

## 2024-10-22 DIAGNOSIS — I10 HYPERTENSION, ESSENTIAL: Primary | ICD-10-CM

## 2024-10-22 DIAGNOSIS — F41.9 ANXIETY: ICD-10-CM

## 2024-10-22 LAB
T4 FREE SERPL-MCNC: 2.48 NG/DL (ref 0.71–1.51)
TSH SERPL DL<=0.005 MIU/L-ACNC: <0.01 UIU/ML (ref 0.4–4)

## 2024-10-22 PROCEDURE — 3066F NEPHROPATHY DOC TX: CPT | Mod: CPTII,S$GLB,, | Performed by: INTERNAL MEDICINE

## 2024-10-22 PROCEDURE — 36415 COLL VENOUS BLD VENIPUNCTURE: CPT | Mod: PN | Performed by: INTERNAL MEDICINE

## 2024-10-22 PROCEDURE — 3061F NEG MICROALBUMINURIA REV: CPT | Mod: CPTII,S$GLB,, | Performed by: INTERNAL MEDICINE

## 2024-10-22 PROCEDURE — 1159F MED LIST DOCD IN RCRD: CPT | Mod: CPTII,S$GLB,, | Performed by: INTERNAL MEDICINE

## 2024-10-22 PROCEDURE — 3008F BODY MASS INDEX DOCD: CPT | Mod: CPTII,S$GLB,, | Performed by: INTERNAL MEDICINE

## 2024-10-22 PROCEDURE — 84443 ASSAY THYROID STIM HORMONE: CPT | Performed by: INTERNAL MEDICINE

## 2024-10-22 PROCEDURE — 84439 ASSAY OF FREE THYROXINE: CPT | Performed by: INTERNAL MEDICINE

## 2024-10-22 PROCEDURE — 99999 PR PBB SHADOW E&M-EST. PATIENT-LVL III: CPT | Mod: PBBFAC,,, | Performed by: INTERNAL MEDICINE

## 2024-10-22 PROCEDURE — 99214 OFFICE O/P EST MOD 30 MIN: CPT | Mod: S$GLB,,, | Performed by: INTERNAL MEDICINE

## 2024-10-22 PROCEDURE — 3044F HG A1C LEVEL LT 7.0%: CPT | Mod: CPTII,S$GLB,, | Performed by: INTERNAL MEDICINE

## 2024-10-22 PROCEDURE — 3078F DIAST BP <80 MM HG: CPT | Mod: CPTII,S$GLB,, | Performed by: INTERNAL MEDICINE

## 2024-10-22 PROCEDURE — 3075F SYST BP GE 130 - 139MM HG: CPT | Mod: CPTII,S$GLB,, | Performed by: INTERNAL MEDICINE

## 2024-10-22 PROCEDURE — 1160F RVW MEDS BY RX/DR IN RCRD: CPT | Mod: CPTII,S$GLB,, | Performed by: INTERNAL MEDICINE

## 2024-10-22 RX ORDER — HYDROXYZINE HYDROCHLORIDE 25 MG/1
25 TABLET, FILM COATED ORAL 3 TIMES DAILY PRN
Qty: 60 TABLET | Refills: 1 | Status: SHIPPED | OUTPATIENT
Start: 2024-10-22

## 2024-10-22 NOTE — PROGRESS NOTES
"Subjective:       Patient ID: Jacinda Hwang is a 38 y.o. female.    Chief Complaint: Follow-up (4m f/u)    Discuss anxiety    HPI: 37 y/o w/ HTN obesity impaired fasting glucose (not using glp1 RA x two months) presents alone for scheduled followu p. She notes at certain times (going to store, helping a difficulty client) she feels nervous and shakey. Improves with deep breathing no falls no syncope no LE swelling       Review of Systems   Constitutional:  Negative for activity change, appetite change, fatigue, fever and unexpected weight change.   HENT:  Negative for ear pain, rhinorrhea and sore throat.    Eyes:  Negative for discharge and visual disturbance.   Respiratory:  Negative for chest tightness, shortness of breath and wheezing.    Cardiovascular:  Negative for chest pain, palpitations and leg swelling.   Gastrointestinal:  Negative for abdominal pain, constipation and diarrhea.   Endocrine: Negative for cold intolerance and heat intolerance.   Genitourinary:  Negative for dysuria and hematuria.   Musculoskeletal:  Negative for joint swelling and neck stiffness.   Skin:  Negative for rash.   Neurological:  Negative for dizziness, syncope, weakness and headaches.   Psychiatric/Behavioral:  Negative for suicidal ideas. The patient is nervous/anxious.        Objective:     Vitals:    10/22/24 1031   BP: 130/76   BP Location: Left arm   Patient Position: Sitting   Pulse: 93   Temp: 98.3 °F (36.8 °C)   TempSrc: Oral   SpO2: 98%   Weight: 100.5 kg (221 lb 9 oz)   Height: 5' 6" (1.676 m)          Physical Exam  Constitutional:       Appearance: She is well-developed.   HENT:      Head: Normocephalic and atraumatic.   Eyes:      General: No scleral icterus.     Conjunctiva/sclera: Conjunctivae normal.   Cardiovascular:      Rate and Rhythm: Normal rate and regular rhythm.      Heart sounds: No murmur heard.     No friction rub. No gallop.   Pulmonary:      Effort: Pulmonary effort is normal.      Breath sounds: " Normal breath sounds. No wheezing or rales.   Abdominal:      Palpations: Abdomen is soft.      Tenderness: There is no abdominal tenderness. There is no guarding or rebound.   Musculoskeletal:         General: No tenderness. Normal range of motion.      Cervical back: Normal range of motion and neck supple.      Right lower leg: No edema.      Left lower leg: No edema.   Lymphadenopathy:      Cervical: No cervical adenopathy.   Skin:     General: Skin is warm and dry.   Neurological:      Mental Status: She is alert and oriented to person, place, and time.      Cranial Nerves: No cranial nerve deficit.         Assessment and Plan   1. Hypertension, essential (Primary)  Bp at goal  - CBC Without Differential; Future  - Comprehensive Metabolic Panel; Future  - TSH; Future    2. Type 2 diabetes mellitus with hyperglycemia, without long-term current use of insulin  Now off medicaiton with normal A1c monitor q6 months  - Comprehensive Metabolic Panel; Future  - Hemoglobin A1C; Future  - Lipid Panel; Future  - TSH; Future    3. Anxiety  Episodice events prn hydroxyzine check thyroid function  - hydrOXYzine HCL (ATARAX) 25 MG tablet; Take 1 tablet (25 mg total) by mouth 3 (three) times daily as needed for Anxiety.  Dispense: 60 tablet; Refill: 1

## 2024-10-22 NOTE — TELEPHONE ENCOUNTER
Patient contacted and ID confirmed by name and   Noted at visit today with intermittent palpitations and heat intolerance occasionally loose stool tsh suppressed with high free T4, confirmed with patient not taking any supplements no recent IV contrast. Will get e consult from endocrine to assist in scheduling nuclear med versus starting thyroid lowering medications patient is in agreement and voices understanding

## 2024-10-23 ENCOUNTER — E-CONSULT (OUTPATIENT)
Dept: ENDOCRINOLOGY | Facility: CLINIC | Age: 39
End: 2024-10-23
Payer: COMMERCIAL

## 2024-10-23 DIAGNOSIS — E05.90 HYPERTHYROIDISM: Primary | ICD-10-CM

## 2024-10-23 NOTE — CONSULTS
[facility] 6th [facility]  Response for E-Consult     Patient Name: Jacinda Hwang  MRN: [id number]  Primary Care Provider: Jus Sellers MD   Requesting Provider: Jus Sellers MD  E-Consult to Endocrinology  Consult performed by: Mary Angelo MD  Consult ordered by: Jus Sellers MD          Recommendation: per PCP: patient with symptomatic hyperthyroidism (still menstruatiing) she reports intermittent palpitations and diaphoresis. not taking any biotin or otehr supplements no recent IV contrast. request recommendations for possible RASCON versus medication treatment     recent labs  with suppressed TSH and elevated fT4. Typically we would repeat TSH reflex to fT4 and T3 in 3mo to ensure persistent hyperthyroidism as thyroiditis can also present in a hyperthyroid phase but is self resolving. Often times a fully suppressed TSH suggests true hyperthyroidism but cannot definitively say this.    Given symptoms, I would recommend sooner labs - 1mo from initial labs -- TSH (will reflex if abnormal to fT4) and total T3. I would also recommend checking TRAb (thyrotropin receptor antibody) which would suggest Graves' Disease.    See below for step by step guidance on evaluation. If she does warrant methimazole treatment based on repeat labs, can start with 10 mg daily with repeat labs 4wks later. However, would ensure diagnostic workup below is done prior to initiating medication to be sure that is the appropriate next step.    If you would like additional assistance with management, please refer to endocrinology.     ::::::::::::::::::::::::::::::::::General Information::::::::::::::::::::::::::::::::::::::::::::::::::::::::::::::::::::::    In the context of new onset hyperthyroidism in a non pregnant individual, symptomatic treatment can be addressed with a beta blocker immediately.      If there is thyroid tenderness, would check a CRP and if elevated would treat as painful thyroiditis with NSAIDs  plus or minus prednisone.    If there is no thyroid tenderness and the patient has overt signs of Graves, including Graves orbitopathy large goiter, methimazole can be started immediately (if there are no concerns for pregnancy).    If there is no thyroid tenderness and the patient does not have overt signs of Graves, would check a TSH receptor antibody and if the antibody is negative proceed with a thyroid scan and uptake as this would determine next steps.    If the TSH receptor antibody is positive, would treat as Graves by starting methimazole.      After initial evaluation is complete,  would refer to Endocrine for definitive management.  With a thyroiditis, full resolution is expected.  With Graves disease medical management versus I 131 therapy is the usual course.  With a toxic nodule or toxic multinodular goiter,  the treatment is generally I 131.    If the patient is pregnant, on amiodarone, or has had contrast in the past few months, would immediately refer to endocrinology without a thyroid scan and uptake        Total time of Consultation: 5 minute    I did not speak to the requesting provider verbally about this.     *This eConsult is based on the clinical data available to me and is furnished without benefit of a physical examination. The eConsult will need to be interpreted in light of any clinical issues or changes in patient status not available to me at the time of filing this eConsults. Significant changes in patient condition or level of acuity should result in immediate formal consultation and reevaluation. Please alert me if you have further questions.    Thank you for this eConsult referral.     MD [facility] - [facility]

## 2024-10-24 ENCOUNTER — PATIENT MESSAGE (OUTPATIENT)
Dept: FAMILY MEDICINE | Facility: CLINIC | Age: 39
End: 2024-10-24
Payer: COMMERCIAL

## 2024-10-24 DIAGNOSIS — E05.90 HYPERTHYROIDISM: Primary | ICD-10-CM

## 2024-10-24 RX ORDER — PROPRANOLOL HYDROCHLORIDE 20 MG/1
20 TABLET ORAL 2 TIMES DAILY
Qty: 60 TABLET | Refills: 3 | Status: SHIPPED | OUTPATIENT
Start: 2024-10-24 | End: 2025-10-24

## 2024-10-24 NOTE — TELEPHONE ENCOUNTER
Patient contacted and ID confirmed by name and   Reviewed endocrine e consult recommendations given symptoms will start non selective beta blocker BID discussed side effects of orthostatics with patient will repeat tsh t3 and tsh RA in two weeks. Arrange in person endocrine evaluation for consideration of RASCON

## 2024-10-31 ENCOUNTER — TELEPHONE (OUTPATIENT)
Dept: FAMILY MEDICINE | Facility: CLINIC | Age: 39
End: 2024-10-31
Payer: COMMERCIAL

## 2024-11-01 ENCOUNTER — TELEPHONE (OUTPATIENT)
Dept: PHARMACY | Facility: CLINIC | Age: 39
End: 2024-11-01
Payer: COMMERCIAL

## 2024-11-07 ENCOUNTER — PATIENT OUTREACH (OUTPATIENT)
Dept: ADMINISTRATIVE | Facility: HOSPITAL | Age: 39
End: 2024-11-07
Payer: COMMERCIAL

## 2024-11-11 ENCOUNTER — LAB VISIT (OUTPATIENT)
Dept: LAB | Facility: HOSPITAL | Age: 39
End: 2024-11-11
Attending: INTERNAL MEDICINE
Payer: COMMERCIAL

## 2024-11-11 DIAGNOSIS — E05.90 HYPERTHYROIDISM: ICD-10-CM

## 2024-11-11 LAB
T3 SERPL-MCNC: >500 NG/DL (ref 60–180)
T4 FREE SERPL-MCNC: 2.43 NG/DL (ref 0.71–1.51)
TSH SERPL DL<=0.005 MIU/L-ACNC: <0.01 UIU/ML (ref 0.4–4)

## 2024-11-11 PROCEDURE — 84439 ASSAY OF FREE THYROXINE: CPT | Performed by: INTERNAL MEDICINE

## 2024-11-11 PROCEDURE — 36415 COLL VENOUS BLD VENIPUNCTURE: CPT | Mod: PN | Performed by: INTERNAL MEDICINE

## 2024-11-11 PROCEDURE — 84443 ASSAY THYROID STIM HORMONE: CPT | Performed by: INTERNAL MEDICINE

## 2024-11-11 PROCEDURE — 83520 IMMUNOASSAY QUANT NOS NONAB: CPT | Performed by: INTERNAL MEDICINE

## 2024-11-11 PROCEDURE — 84480 ASSAY TRIIODOTHYRONINE (T3): CPT | Performed by: INTERNAL MEDICINE

## 2024-11-12 ENCOUNTER — TELEPHONE (OUTPATIENT)
Dept: ENDOCRINOLOGY | Facility: CLINIC | Age: 39
End: 2024-11-12
Payer: COMMERCIAL

## 2024-11-12 LAB — TSH RECEP AB SER-ACNC: >40 IU/L (ref 0–1.75)

## 2024-11-13 ENCOUNTER — TELEPHONE (OUTPATIENT)
Dept: FAMILY MEDICINE | Facility: CLINIC | Age: 39
End: 2024-11-13
Payer: COMMERCIAL

## 2024-11-13 DIAGNOSIS — E05.00 GRAVES DISEASE: Primary | ICD-10-CM

## 2024-11-13 RX ORDER — METHIMAZOLE 5 MG/1
5 TABLET ORAL DAILY
Qty: 30 TABLET | Refills: 2 | Status: SHIPPED | OUTPATIENT
Start: 2024-11-13 | End: 2025-11-13

## 2024-11-13 NOTE — TELEPHONE ENCOUNTER
----- Message from Jus Sellers MD sent at 11/12/2024  7:11 AM CST -----  Regarding: hyperthyroid consultation  Dr. Strange:      This patient with newly diagnosed hyperthyroid (likely grave's) on propranolol. She is scheduled to see you in Feb 2025. My main question was for benefit of RASCON for her, and am wondering if you have any sooner availability?     Thank you for your assistance.    Eber Sellers

## 2024-11-13 NOTE — TELEPHONE ENCOUNTER
----- Message from Leif Strange MD sent at 11/12/2024  5:59 PM CST -----  Regarding: RE: hyperthyroid consultation  We tend to give patient methimazole/medical therapy 1st, I recommend that she gets at least 5 mg daily of methimazole now.    I do not give RASCON here.  [facility] will have to do it.  Usually that is given for patient to failed oral  ----- Message -----  From: Jus Sellers MD  Sent: 11/12/2024   7:12 AM CST  To: Leif Strange MD  Subject: hyperthyroid consultation                        Dr. Strange:      This patient with newly diagnosed hyperthyroid (likely grave's) on propranolol. She is scheduled to see you in Feb 2025. My main question was for benefit of RASCON for her, and am wondering if you have any sooner availability?     Thank you for your assistance.    Eber Sellers

## 2024-11-13 NOTE — TELEPHONE ENCOUNTER
Patient contacted and ID confirmed by name and   Reviewed labs showing high antibody titer consistent with graves. Reports feeling less anxious since starting propranolol no orthostatic symptoms. Will start methimazole 5mg once daily will work to move up endocrine evaluation to 2025 all questions answered

## 2024-11-25 ENCOUNTER — OFFICE VISIT (OUTPATIENT)
Dept: ENDOCRINOLOGY | Facility: CLINIC | Age: 39
End: 2024-11-25
Payer: COMMERCIAL

## 2024-11-25 VITALS
HEART RATE: 83 BPM | BODY MASS INDEX: 36.15 KG/M2 | SYSTOLIC BLOOD PRESSURE: 130 MMHG | WEIGHT: 224 LBS | DIASTOLIC BLOOD PRESSURE: 78 MMHG

## 2024-11-25 DIAGNOSIS — E66.01 MORBID (SEVERE) OBESITY DUE TO EXCESS CALORIES: ICD-10-CM

## 2024-11-25 DIAGNOSIS — E11.65 TYPE 2 DIABETES MELLITUS WITH HYPERGLYCEMIA, WITHOUT LONG-TERM CURRENT USE OF INSULIN: ICD-10-CM

## 2024-11-25 DIAGNOSIS — E05.90 HYPERTHYROIDISM: Primary | ICD-10-CM

## 2024-11-25 DIAGNOSIS — E05.00 GRAVES DISEASE: ICD-10-CM

## 2024-11-25 PROCEDURE — 3078F DIAST BP <80 MM HG: CPT | Mod: CPTII,S$GLB,, | Performed by: HOSPITALIST

## 2024-11-25 PROCEDURE — 3066F NEPHROPATHY DOC TX: CPT | Mod: CPTII,S$GLB,, | Performed by: HOSPITALIST

## 2024-11-25 PROCEDURE — 99204 OFFICE O/P NEW MOD 45 MIN: CPT | Mod: S$GLB,,, | Performed by: HOSPITALIST

## 2024-11-25 PROCEDURE — 3061F NEG MICROALBUMINURIA REV: CPT | Mod: CPTII,S$GLB,, | Performed by: HOSPITALIST

## 2024-11-25 PROCEDURE — 1159F MED LIST DOCD IN RCRD: CPT | Mod: CPTII,S$GLB,, | Performed by: HOSPITALIST

## 2024-11-25 PROCEDURE — 99999 PR PBB SHADOW E&M-EST. PATIENT-LVL IV: CPT | Mod: PBBFAC,,, | Performed by: HOSPITALIST

## 2024-11-25 PROCEDURE — 3008F BODY MASS INDEX DOCD: CPT | Mod: CPTII,S$GLB,, | Performed by: HOSPITALIST

## 2024-11-25 PROCEDURE — 3075F SYST BP GE 130 - 139MM HG: CPT | Mod: CPTII,S$GLB,, | Performed by: HOSPITALIST

## 2024-11-25 PROCEDURE — 3044F HG A1C LEVEL LT 7.0%: CPT | Mod: CPTII,S$GLB,, | Performed by: HOSPITALIST

## 2024-11-25 PROCEDURE — 1160F RVW MEDS BY RX/DR IN RCRD: CPT | Mod: CPTII,S$GLB,, | Performed by: HOSPITALIST

## 2024-11-25 NOTE — ASSESSMENT & PLAN NOTE
- Lab work consistent of hyperthyroidism most likely due to autoimmune thyroiditis: Given positive TRAB antibody  - Reviewed lab trends previously with the patient today, including TSH and Free T4 11/25/2024  - explained that her weight loss was due to metabolic stress, and that she will gain back her weight once her thyroid function is in stable range.  Patient is aware  - CONTINUE METHIMAZOLE 5 MG DAILY, started only on 11/13/2024 repeat lab work in 2 weeks: TSH/T4 for monitoring  - Emphasized the importance of compliance with methimazole for symptom management.   - Scheduled additional labs in 3 months to include TSH, Free T4, T3, and TRAb.  - Monitor for thyroid eye symptoms  - Check thyroid ultrasound  - continue beta blocker for now, will stop in the future once thyroid function is stabilized

## 2024-11-25 NOTE — PROGRESS NOTES
Subjective:      Patient ID: Jacinda Hwang is a 39 y.o. female presented to [facility] today.  Chief complaint:  Hyperthyroidism      History of Present illness: Jacinda Hwang is a 39 y.o. female here for  hyperthyroidism/Graves disease  Other significant past medical history:  Obesity, type 2 diabetes  Current PCP Jus Sellers MD    Interval History:  Sent by PCP for management of hyperthyroidism/Graves disease.  New diagnosis, noted on lab work done 10/22/2024 with suppressed TSH and elevated T4 at 2.48  Patient with hyperthyroidism symptoms for the last 2 months as reported below.  Including drastic weight loss of 50 lb.    Patient was started on methimazole after eConsult with our endocrinologist.  By PCP on 11/13/2025  Since taking medication, her main concern is weight gain.  She is not happy   Albeit she reports her jitteriness, palpitation has improved.  Still on propranolol  Current in clinic weight:  224 lb, prior to hyperthyroidism in clinic weight: 251 lb (2/14/2024), 265 lb 2022, in the different clinic)  Goiter noted.      Hyperthyroidism  - First diagnosed: 10/22/2024, Previously monitor and managed by PCP: 2 months>> symptoms since 7/2024  - Lab work consistent of positive TRAB  - Family history of thyroid problem:  No, patient will ask more family members  - Family history of thyroid cancer: no  - Tobacco use, including use in the past:  no   - Recent radiation exposure/IV contrast:  no   - Children: 13 and 8   - Patient is using supplements including iodine, kelp, seaweed, Seamoss, biotin, weight loss supplements  - New medications including steroid: no  - Recreational/illicit drug use: no    Current therapies include: Methimazole 5 mg daily, compliant with medication  Currently taking beta blockers:  Propranolol     Symptoms of hyperthyroidism reported as of initial evaluation 11/25/2024  Positive for: headache, irritability, jitteriness, restlessness, weight loss:  50 lbs (2 months),  palpitations, diarrhea, increased stool frequency,     Negative for:   - Timing of symptoms are sudden in onset and are still present    Graves' Ophthalmopathy   Spontaneous orbital pain:  no   Gaze evoked orbital pain:  no   Eyelid swelling:   no   Eyelid erythema:    no     Thyroid lab work  Lab Results   Component Value Date    TSH <0.010 (L) 11/11/2024    TSH <0.010 (L) 10/22/2024    FREET4 2.43 (H) 11/11/2024    FREET4 2.48 (H) 10/22/2024    O6YXBXZ >500 (H) 11/11/2024      Antibodies  Lab Results   Component Value Date    THYROTROPINR >40 (H) 11/11/2024     The patient's medications, allergies, past medical, surgical, social and family histories were reviewed and updated as appropriate.  Review of Systems: pertinent positives as per the above HPI, and otherwise negative.    Objective:   /78   Pulse 83   Wt 101.6 kg (224 lb)   BMI 36.15 kg/m²   Body mass index is 36.15 kg/m².  Vital signs reviewed    Physical Exam  Vitals and nursing note reviewed.   Constitutional:       Appearance: Normal appearance. She is well-developed. She is obese. She is not ill-appearing.   Neck:      Thyroid: No thyromegaly.      Comments: Large Goiter noted on exam  Pulmonary:      Effort: Pulmonary effort is normal. No respiratory distress.   Musculoskeletal:         General: Normal range of motion.      Cervical back: Normal range of motion.   Neurological:      General: No focal deficit present.      Mental Status: She is alert. Mental status is at baseline.   Psychiatric:         Mood and Affect: Mood normal.         Behavior: Behavior normal.         Labs reviewed:  See results in subjective  Lab Results   Component Value Date    HGBA1C 5.5 09/23/2024     Lab Results   Component Value Date    CHOL 129 02/14/2024    HDL 35 (L) 02/14/2024    LDLCALC 83.0 02/14/2024    TRIG 55 02/14/2024    CHOLHDL 27.1 02/14/2024     Lab Results   Component Value Date     09/23/2024    K 3.6 09/23/2024      09/23/2024    CO2 23 09/23/2024    GLU 97 09/23/2024    BUN 11 09/23/2024    CREATININE 0.6 09/23/2024    CALCIUM 9.7 09/23/2024    PROT 7.0 09/23/2024    ALBUMIN 2.9 (L) 09/23/2024    BILITOT 0.4 09/23/2024    ALKPHOS 159 (H) 09/23/2024    AST 23 09/23/2024    ALT 33 09/23/2024    ANIONGAP 8 09/23/2024    EGFRNORACEVR >60 09/23/2024    TSH <0.010 (L) 11/11/2024     Assessment     1. Hyperthyroidism  Ambulatory referral/consult to Endocrinology    US Thyroid    TSH    T4, Free    T3    Thyrotropin Receptor Antibody    TSH    T4, Free      2. Graves disease  US Thyroid    TSH    T4, Free    T3    Thyrotropin Receptor Antibody      3. Morbid (severe) obesity due to excess calories        4. Type 2 diabetes mellitus with hyperglycemia, without long-term current use of insulin           Plan     Hyperthyroidism  - Lab work consistent of hyperthyroidism most likely due to autoimmune thyroiditis: Given positive TRAB antibody  - Reviewed lab trends previously with the patient today, including TSH and Free T4 11/25/2024  - explained that her weight loss was due to metabolic stress, and that she will gain back her weight once her thyroid function is in stable range.  Patient is aware  - CONTINUE METHIMAZOLE 5 MG DAILY, started only on 11/13/2024 repeat lab work in 2 weeks: TSH/T4 for monitoring  - Emphasized the importance of compliance with methimazole for symptom management.   - Scheduled additional labs in 3 months to include TSH, Free T4, T3, and TRAb.  - Monitor for thyroid eye symptoms  - Check thyroid ultrasound  - continue beta blocker for now, will stop in the future once thyroid function is stabilized    Graves disease  - Patient has a positive antibody, with hyperthyroidism  - Recommended a gluten-free or dairy/lactose avoidance diet due to its potential association with autoimmune conditions of the small intestine.  - Provided an anti-inflammatory diet handout to support further dietary changes for long-term  thyroid and immune health.  - check ultrasound   - trend/monitor TRAB antibody    Morbid (severe) obesity due to excess calories  - Body mass index is 36.15 kg/m².  - Encourage pt to work on healthy diet, increase exercise as tolerated.  - advise that once her thyroid function improve, she will have weight gain.  - recommend getting back on GLP1      Type 2 diabetes mellitus with hyperglycemia, without long-term current use of insulin  - controlled type 2 diabetes   - continue management with PCP      Advised patient to follow up with PCP for routine health maintenance care.   RTC in 3 months    Leif Strange M.D.  Endocrinology  [facility]  11/25/2024      Disclaimer: This note has been generated in part with the use of voice-recognition software. There may be typographical errors that have been missed during proof-reading.

## 2024-11-25 NOTE — ASSESSMENT & PLAN NOTE
- Patient has a positive antibody, with hyperthyroidism  - Recommended a gluten-free or dairy/lactose avoidance diet due to its potential association with autoimmune conditions of the small intestine.  - Provided an anti-inflammatory diet handout to support further dietary changes for long-term thyroid and immune health.  - check ultrasound   - trend/monitor TRAB antibody

## 2024-11-25 NOTE — ASSESSMENT & PLAN NOTE
- Body mass index is 36.15 kg/m².  - Encourage pt to work on healthy diet, increase exercise as tolerated.  - advise that once her thyroid function improve, she will have weight gain.  - recommend getting back on GLP1

## 2024-12-09 ENCOUNTER — LAB VISIT (OUTPATIENT)
Dept: LAB | Facility: HOSPITAL | Age: 39
End: 2024-12-09
Attending: HOSPITALIST
Payer: COMMERCIAL

## 2024-12-09 DIAGNOSIS — E05.90 HYPERTHYROIDISM: ICD-10-CM

## 2024-12-09 LAB
T4 FREE SERPL-MCNC: 2.17 NG/DL (ref 0.71–1.51)
TSH SERPL DL<=0.005 MIU/L-ACNC: <0.01 UIU/ML (ref 0.4–4)

## 2024-12-09 PROCEDURE — 84443 ASSAY THYROID STIM HORMONE: CPT | Performed by: HOSPITALIST

## 2024-12-09 PROCEDURE — 84439 ASSAY OF FREE THYROXINE: CPT | Performed by: HOSPITALIST

## 2024-12-09 PROCEDURE — 36415 COLL VENOUS BLD VENIPUNCTURE: CPT | Mod: PN | Performed by: HOSPITALIST

## 2024-12-12 ENCOUNTER — TELEPHONE (OUTPATIENT)
Dept: PHARMACY | Facility: CLINIC | Age: 39
End: 2024-12-12
Payer: COMMERCIAL

## 2024-12-12 NOTE — TELEPHONE ENCOUNTER
[facility] Chart Review & Patient Outreach Details For Medication Adherence Project    Reason for Outreach Encounter: 3rd Party payor non-compliance report ([employer], [employer], C, etc)  2.  Patient Outreach Method: Labotechart message  3.   Medication in question: atorvastatin    LAST FILLED: 7/30/24 for 90 day supply  Hyperlipidemia Medications               atorvastatin (LIPITOR) 20 MG tablet Take 20 mg by mouth every evening.               4.  Reviewed and or Updates Made To: Patient Chart  5. Outreach Outcomes and/or actions taken: Sent inquiry to patient: Waiting for response.

## 2024-12-16 ENCOUNTER — PATIENT MESSAGE (OUTPATIENT)
Dept: ENDOCRINOLOGY | Facility: CLINIC | Age: 39
End: 2024-12-16
Payer: COMMERCIAL

## 2024-12-16 DIAGNOSIS — E05.00 GRAVES DISEASE: ICD-10-CM

## 2024-12-16 RX ORDER — METHIMAZOLE 10 MG/1
10 TABLET ORAL DAILY
Qty: 90 TABLET | Refills: 1 | Status: SHIPPED | OUTPATIENT
Start: 2024-12-16 | End: 2025-12-16

## 2024-12-21 DIAGNOSIS — E78.2 MIXED HYPERLIPIDEMIA: Primary | ICD-10-CM

## 2024-12-21 NOTE — TELEPHONE ENCOUNTER
Pratibha Sellers, Jus ALONSO MD,  my name is Evonne Love and I am a pharmacist with [facility]. Part of my job as a pharmacist at [facility] is to perform medication therapy management which includes reviewing the reports that [facility] sends us that show the dates of the last time any diabetic, cholesterol and certain high blood pressure medications were filled and checking with the patients to see if they are in need of any refills to be sent to their pharmacy.     Ms. Hwang states they would like this prescription refilled so I have pended it for your review and approval. Our goal is to make sure patients stay adherent and do not miss any days of therapy due to delayed refills. Please review and take action as you see fit. Thank you.

## 2024-12-21 NOTE — TELEPHONE ENCOUNTER
No care due was identified.  Health [facility] Embedded Care Due Messages. Reference number: [id number].   12/21/2024 3:26:27 PM CST

## 2024-12-23 RX ORDER — ATORVASTATIN CALCIUM 20 MG/1
20 TABLET, FILM COATED ORAL NIGHTLY
Qty: 90 TABLET | Refills: 3 | Status: SHIPPED | OUTPATIENT
Start: 2024-12-23

## 2024-12-23 RX ORDER — FERROUS GLUCONATE 324(38)MG
1 TABLET ORAL NIGHTLY
Qty: 90 TABLET | Refills: 3 | Status: SHIPPED | OUTPATIENT
Start: 2024-12-23

## 2025-01-11 ENCOUNTER — TELEPHONE (OUTPATIENT)
Dept: PHARMACY | Facility: CLINIC | Age: 40
End: 2025-01-11
Payer: COMMERCIAL

## 2025-01-11 NOTE — TELEPHONE ENCOUNTER
[facility] Chart Review & Patient Outreach Details For Medication Adherence Project    Reason for Outreach Encounter: 3rd Party payor non-compliance report ([employer], [employer], [facility], etc)  2.  Patient Outreach Method: Weatlashart message  3.   Medication in question: atorvastatin    LAST FILLED: 7/30/24 for 90 day supply  Hyperlipidemia Medications               atorvastatin (LIPITOR) 20 MG tablet Take 1 tablet (20 mg total) by mouth every evening.               4.  Reviewed and or Updates Made To: Patient Chart  5. Outreach Outcomes and/or actions taken: Sent refill reminder to patient: Waiting for response. SPOKE TO PT LAST MONTH AND PCP SENT IN NEW RX AS DISCUSSED.

## 2025-02-26 DIAGNOSIS — E11.9 TYPE 2 DIABETES MELLITUS WITHOUT COMPLICATION, UNSPECIFIED WHETHER LONG TERM INSULIN USE: ICD-10-CM

## 2025-04-14 ENCOUNTER — PATIENT OUTREACH (OUTPATIENT)
Dept: ADMINISTRATIVE | Facility: HOSPITAL | Age: 40
End: 2025-04-14
Payer: COMMERCIAL

## 2025-04-14 NOTE — PROGRESS NOTES
Portal message sent with tasked appointment to schedule eye exam. Immunization's updated/triggered. Gap report updated.

## 2025-05-16 ENCOUNTER — PATIENT OUTREACH (OUTPATIENT)
Dept: ADMINISTRATIVE | Facility: HOSPITAL | Age: 40
End: 2025-05-16
Payer: COMMERCIAL

## 2025-05-16 ENCOUNTER — PATIENT MESSAGE (OUTPATIENT)
Dept: ADMINISTRATIVE | Facility: HOSPITAL | Age: 40
End: 2025-05-16
Payer: COMMERCIAL

## 2025-05-16 NOTE — PROGRESS NOTES
Portal message sent with tasked appointment to schedule eye exam and mammogram. Immunization's updated/triggered.

## 2025-06-18 ENCOUNTER — HOSPITAL ENCOUNTER (EMERGENCY)
Facility: HOSPITAL | Age: 40
Discharge: HOME | End: 2025-06-19
Attending: EMERGENCY MEDICINE
Payer: COMMERCIAL

## 2025-06-18 DIAGNOSIS — R51.9 HEADACHE: ICD-10-CM

## 2025-06-18 LAB
ALBUMIN SERPL-MCNC: 3.5 G/DL (ref 3.3–5.5)
ALP SERPL-CCNC: 144 U/L (ref 42–141)
B-HCG UR QL: NEGATIVE
BILIRUB SERPL-MCNC: 0.5 MG/DL (ref 0.2–1.6)
BUN SERPL-MCNC: 11 MG/DL (ref 7–22)
CALCIUM SERPL-MCNC: 9.4 MG/DL (ref 8–10.3)
CHLORIDE SERPL-SCNC: 106 MMOL/L (ref 98–108)
CREAT SERPL-MCNC: 0.7 MG/DL (ref 0.6–1.2)
CTP QC/QA: YES
GLUCOSE SERPL-MCNC: 110 MG/DL (ref 73–118)
HCT, POC: NORMAL
HGB, POC: NORMAL (ref 14–18)
MCH, POC: NORMAL
MCHC, POC: NORMAL
MCV, POC: NORMAL
MPV, POC: NORMAL
POC ALT (SGPT): 21 U/L (ref 10–47)
POC AST (SGOT): 21 U/L (ref 11–38)
POC PLATELET COUNT: NORMAL
POC TCO2: 28 MMOL/L (ref 18–33)
POTASSIUM BLD-SCNC: 3.5 MMOL/L (ref 3.6–5.1)
PROTEIN, POC: 7.4 G/DL (ref 6.4–8.1)
RBC, POC: NORMAL
RDW, POC: NORMAL
SODIUM BLD-SCNC: 138 MMOL/L (ref 128–145)
WBC, POC: NORMAL

## 2025-06-18 PROCEDURE — 99284 EMERGENCY DEPT VISIT MOD MDM: CPT | Mod: 25,ER

## 2025-06-18 PROCEDURE — 85652 RBC SED RATE AUTOMATED: CPT

## 2025-06-18 PROCEDURE — 85025 COMPLETE CBC W/AUTO DIFF WBC: CPT | Mod: ER

## 2025-06-18 PROCEDURE — 80053 COMPREHEN METABOLIC PANEL: CPT | Mod: ER

## 2025-06-18 PROCEDURE — 81025 URINE PREGNANCY TEST: CPT | Mod: ER

## 2025-06-18 PROCEDURE — 96372 THER/PROPH/DIAG INJ SC/IM: CPT

## 2025-06-18 PROCEDURE — 63600175 PHARM REV CODE 636 W HCPCS: Mod: JZ,TB,ER

## 2025-06-18 PROCEDURE — 86140 C-REACTIVE PROTEIN: CPT

## 2025-06-18 RX ORDER — KETOROLAC TROMETHAMINE 30 MG/ML
30 INJECTION, SOLUTION INTRAMUSCULAR; INTRAVENOUS
Status: COMPLETED | OUTPATIENT
Start: 2025-06-18 | End: 2025-06-18

## 2025-06-18 RX ADMIN — KETOROLAC TROMETHAMINE 30 MG: 30 INJECTION, SOLUTION INTRAMUSCULAR at 09:06

## 2025-06-19 VITALS
BODY MASS INDEX: 39.99 KG/M2 | RESPIRATION RATE: 17 BRPM | OXYGEN SATURATION: 99 % | DIASTOLIC BLOOD PRESSURE: 76 MMHG | SYSTOLIC BLOOD PRESSURE: 125 MMHG | WEIGHT: 240 LBS | HEIGHT: 65 IN | TEMPERATURE: 98 F | HEART RATE: 76 BPM

## 2025-06-19 LAB
CRP SERPL-MCNC: 14.3 MG/L
ERYTHROCYTE [SEDIMENTATION RATE] IN BLOOD BY PHOTOMETRIC METHOD: 63 MM/HR

## 2025-06-19 RX ORDER — BUTALBITAL, ACETAMINOPHEN AND CAFFEINE 50; 325; 40 MG/1; MG/1; MG/1
1 TABLET ORAL EVERY 4 HOURS PRN
Qty: 40 TABLET | Refills: 0 | Status: SHIPPED | OUTPATIENT
Start: 2025-06-19 | End: 2025-06-29

## 2025-06-19 NOTE — ED PROVIDER NOTES
"Encounter Date: 2025       History     Chief Complaint   Patient presents with    Headache     PT REPORTS RIGHT SIDED TEMPLE FACE AND HEAD PAIN WITHOUT RELIEF FROM OTC MEDS FOR APPROX 2 WEEKS, REPORTS HAS BEEN TO EYE DR AND DENTIST WITHIN THE LAST 2 WEEKS     39-year-old female with past medical history of Graves disease and hypertension presents to the emergency department complaining right-sided headache for the past 2 weeks.  Patient states she decided to come to the emergency department today because she started to experience "fire" in her vision.  Patient states today while driving the roads looked "mushy".  Patient states vision changes or only in the right eye.  Patient also complains of jaw pain. Patient states she recently went into the dentist 6 days ago.  Patient states she is not currently having vision changes.  Patient denies fever, nausea, vomiting, neck pain, chest pain, shortness of breath, syncope, dizziness.        Review of patient's allergies indicates:  No Known Allergies  Past Medical History:   Diagnosis Date    Diabetes mellitus     Hypertension     Thyroid disease      Past Surgical History:   Procedure Laterality Date     SECTION, CLASSIC      x1    SALPINGOOPHORECTOMY  2020    left ovary and tube    TUBAL LIGATION       Family History   Problem Relation Name Age of Onset    No Known Problems Mother      No Known Problems Father      No Known Problems Sister      No Known Problems Brother      No Known Problems Maternal Aunt      No Known Problems Maternal Uncle      No Known Problems Paternal Aunt      No Known Problems Paternal Uncle      No Known Problems Maternal Grandmother      No Known Problems Maternal Grandfather      No Known Problems Paternal Grandmother      No Known Problems Paternal Grandfather      No Known Problems Other       Social History[1]  Review of Systems   Constitutional:  Negative for fever.   HENT:  Negative for congestion, facial swelling, " sinus pressure, sinus pain and sore throat.    Eyes:  Positive for visual disturbance (Resolved). Negative for photophobia, pain, discharge and itching.   Respiratory:  Negative for shortness of breath.    Cardiovascular:  Negative for chest pain.   Gastrointestinal:  Negative for abdominal pain, nausea and vomiting.   Genitourinary:  Negative for dysuria.   Musculoskeletal:  Negative for back pain, gait problem and neck pain.   Skin:  Negative for rash.   Neurological:  Negative for dizziness, syncope, weakness, light-headedness and numbness.   Hematological:  Does not bruise/bleed easily.       Physical Exam     Initial Vitals [06/18/25 2022]   BP Pulse Resp Temp SpO2   (!) 147/92 103 20 98 °F (36.7 °C) 98 %      MAP       --         Physical Exam    Nursing note and vitals reviewed.  Constitutional: She appears well-developed and well-nourished.   HENT:   Head: Normocephalic and atraumatic.   Right Ear: Tympanic membrane, external ear and ear canal normal.   Left Ear: Tympanic membrane, external ear and ear canal normal.   Nose: Nose normal. Mouth/Throat: Oropharynx is clear and moist.   Tenderness to palpation over the temporal artery.  No overlying swelling or erythema.  Tenderness to right TMJ.    Eyes: Conjunctivae, EOM and lids are normal. Pupils are equal, round, and reactive to light.   Neck: Trachea normal. Neck supple. No Brudzinski's sign noted.   Cardiovascular:  Normal rate, regular rhythm, S1 normal, S2 normal, normal heart sounds and normal pulses.     Exam reveals no gallop and no friction rub.       No murmur heard.  Pulmonary/Chest: Effort normal and breath sounds normal. No respiratory distress. She has no decreased breath sounds. She has no wheezes. She has no rhonchi. She has no rales.   Abdominal: Abdomen is soft. She exhibits no distension.   Musculoskeletal:         General: Normal range of motion.      Cervical back: Neck supple. No rigidity. No spinous process tenderness or muscular  "tenderness.      Comments: Patient is able to move all extremities. 5/5 strength at upper and lower extremities.        Lymphadenopathy:     She has no cervical adenopathy.   Neurological: She is alert. She has normal strength. No cranial nerve deficit or sensory deficit. GCS eye subscore is 4. GCS verbal subscore is 5. GCS motor subscore is 6.   Skin: Skin is warm and dry. No rash noted.   Psychiatric: She has a normal mood and affect.         ED Course   Procedures  Labs Reviewed   SEDIMENTATION RATE - Abnormal       Result Value    Sed Rate 63 (*)    C-REACTIVE PROTEIN - Abnormal    CRP 14.3 (*)    POCT CMP - Abnormal    Albumin, POC 3.5      Alkaline Phosphatase,  (*)     ALT (SGPT), POC 21      AST (SGOT), POC 21      POC BUN 11      Calcium, POC 9.4      POC Chloride 106      POC Creatinine 0.7      POC Glucose 110      POC Potassium 3.5 (*)     POC Sodium 138      Bilirubin, POC 0.5      POC TCO2 28      Protein, POC 7.4     POCT URINE PREGNANCY    POC Preg Test, Ur Negative       Acceptable Yes     POCT CBC    Hematocrit        Hemoglobin        RBC        WBC        MCV        MCH, POC        MCHC        RDW-CV        Platelet Count, POC        MPV       POCT CMP          Imaging Results    None          Medications   ketorolac injection 30 mg (30 mg Intramuscular Given 6/18/25 2159)     Medical Decision Making  Encounter Date: 6/18/2025    39 y.o. female presents for evaluation of ight-sided headache for the past 2 weeks.  Patient states she decided to come to the emergency department today because she started to experience "fire" in her vision.  Patient states today while driving the roads looked "mushy". Hemodynamically stable. Afebrile. Phonating and protecting the airway spontaneously. No clinical evidence for cardiovascular instability or impending airway compromise.  Remainder of physical exam as above.   Prior medical records reviewed. PMD note reviewed. Current co-morbidities " considered that will impact clinical decision making include as above.   Vitals range:   Temp:  [98 °F (36.7 °C)] 98 °F (36.7 °C)  Pulse:  [] 83  Resp:  [17-20] 17  SpO2:  [98 %-100 %] 98 %  BP: (136-150)/(81-92) 136/81    Differential diagnoses includes but is not limited to:   - SAH: not sudden onset or worst headache of life  - epidural/subdural hematoma: no head injury  - CVA: normal neuro exam  - glaucoma: age inconsistent, eye exam wnl  - meningitis: no neck stiffness    Pt has benign initial and repeat  neuro exam here in ED, no visual changes or ataxia.   On physical exam she is neurologically intact.  There are no focal neurological deficits. No visual changes in the emergency department or ataxia. Pupils equal round reactive to light.  Bilateral eyeballs feel normal to palpation.  Her headache did not have any sudden-onset.  She is not complaining of fever and she was afebrile today.  She has had no trauma to the head.  She has had no trauma to the neck or pain radiating to her neck.  She has not been around any sick contacts recently.  Due to these findings, I do not believe a head CT is necessary today.     Pain significantly improved with Toradol.  No leukocytosis on CBC. Dr. Hair will assume patient's care pending ESR/CRP to rule out temporal arteritis.    ED MEDS GIVEN:  Medications  ketorolac injection 30 mg (30 mg Intramuscular Given 6/18/25 2159)    Clinical Impression:  Final diagnoses:  [R51.9] Headache  [R51.9] Headache - right temporal artery    Amount and/or Complexity of Data Reviewed  Labs: ordered. Decision-making details documented in ED Course.  Radiology: ordered.    Risk  Prescription drug management.               ED Course as of 06/20/25 0030   Thu Jun 19, 2025   0009 POCT CBC  WBC 9.5 hemoglobin 12.5 hematocrit 36.8 platelet 281 [JM]   0018 Sed Rate(!): 63 [JM]   0033 CRP(!): 14.3  I discussed the elevated inflammatory markers with the patient.  The patient states her  headache is very mild at this time.  She wishes to be discharged.  I discussed the differential including temporal arteritis which could lead to potential loss of vision which would be irreparable.  The patient stated understanding.  Given that her symptoms have significantly improved she wishes to be discharged.  I discussed possible transfer to [facility] for arterial ultrasound however the patient again wishes to be discharged.  She states that if she continues to have a headache in the morning she will return to either [facility] or [facility] for possible further imaging.  She wishes to forego testing for the time being. [JM]      ED Course User Index  [JM] Levy Hair MD                           Clinical Impression:  Final diagnoses:  [R51.9] Headache          ED Disposition Condition    Discharge Stable          ED Prescriptions       Medication Sig Dispense Start Date End Date Auth. Provider    butalbital-acetaminophen-caffeine -40 mg (FIORICET, ESGIC) -40 mg per tablet Take 1 tablet by mouth every 4 (four) hours as needed for Pain. 40 tablet 6/19/2025 6/29/2025 Levy Hair MD          Follow-up Information       Follow up With Specialties Details Why Contact Info    [facility] NEUROLOGY Neurology Schedule an appointment as soon as possible for a visit in 1 week Neurology [address] [facility] [place] [address] [phone]    [facility] ED Emergency Medicine  If symptoms worsen [address] [facility] [address] [phone]    Jus Sellers MD Internal Medicine, Wound Care Schedule an appointment as soon as possible for a visit in 1 week Primary care [address] [facility] [address]  [phone]                     Jerel Doherty PA-C  06/18/25 4246       [1]   Social History  Tobacco Use    Smoking status: Never    Smokeless tobacco: Never   Vaping Use    Vaping status: Never Used   Substance  Use Topics    Alcohol use: Yes     Alcohol/week: 1.0 standard drink of alcohol     Types: 1 Glasses of wine per week     Comment: social    Drug use: No        Jerel Doherty, GUZMAN  06/20/25 0031

## 2025-06-19 NOTE — DISCHARGE INSTRUCTIONS
If the headache returns recommend further evaluation.  Recommend returning to [facility] or [facility] for possible further testing and imaging.  A referral has been placed to Neurology for the headaches.  Expect a phone call in the next 1 week to help schedule appointment time.    Seek medical care if symptoms worsen or any concerns.

## 2025-06-20 ENCOUNTER — HOSPITAL ENCOUNTER (EMERGENCY)
Facility: OTHER | Age: 40
Discharge: HOME | End: 2025-06-20
Attending: EMERGENCY MEDICINE
Payer: COMMERCIAL

## 2025-06-20 ENCOUNTER — OFFICE VISIT (OUTPATIENT)
Dept: NEUROLOGY | Facility: CLINIC | Age: 40
End: 2025-06-20
Payer: COMMERCIAL

## 2025-06-20 VITALS
BODY MASS INDEX: 39.94 KG/M2 | HEART RATE: 79 BPM | TEMPERATURE: 98 F | DIASTOLIC BLOOD PRESSURE: 75 MMHG | SYSTOLIC BLOOD PRESSURE: 131 MMHG | WEIGHT: 240 LBS | RESPIRATION RATE: 16 BRPM | OXYGEN SATURATION: 97 %

## 2025-06-20 DIAGNOSIS — R51.9 RIGHT-SIDED HEADACHE: Primary | ICD-10-CM

## 2025-06-20 DIAGNOSIS — H53.8 BLURRED VISION: ICD-10-CM

## 2025-06-20 DIAGNOSIS — R51.9 NONINTRACTABLE HEADACHE, UNSPECIFIED CHRONICITY PATTERN, UNSPECIFIED HEADACHE TYPE: ICD-10-CM

## 2025-06-20 LAB
HCV AB SERPL QL IA: NEGATIVE
HIV 1+2 AB+HIV1 P24 AG SERPL QL IA: NEGATIVE
HOLD SPECIMEN: NORMAL

## 2025-06-20 PROCEDURE — 87389 HIV-1 AG W/HIV-1&-2 AB AG IA: CPT

## 2025-06-20 PROCEDURE — 63600175 PHARM REV CODE 636 W HCPCS: Performed by: NURSE PRACTITIONER

## 2025-06-20 PROCEDURE — 86803 HEPATITIS C AB TEST: CPT

## 2025-06-20 PROCEDURE — 99285 EMERGENCY DEPT VISIT HI MDM: CPT | Mod: 25

## 2025-06-20 PROCEDURE — 96374 THER/PROPH/DIAG INJ IV PUSH: CPT

## 2025-06-20 PROCEDURE — 25000003 PHARM REV CODE 250: Performed by: NURSE PRACTITIONER

## 2025-06-20 RX ORDER — PREDNISONE 20 MG/1
60 TABLET ORAL DAILY
Qty: 21 TABLET | Refills: 0 | Status: SHIPPED | OUTPATIENT
Start: 2025-06-20 | End: 2025-06-27

## 2025-06-20 RX ORDER — FLUCONAZOLE 150 MG/1
150 TABLET ORAL
Status: COMPLETED | OUTPATIENT
Start: 2025-06-20 | End: 2025-06-20

## 2025-06-20 RX ORDER — KETOROLAC TROMETHAMINE 30 MG/ML
15 INJECTION, SOLUTION INTRAMUSCULAR; INTRAVENOUS
Status: COMPLETED | OUTPATIENT
Start: 2025-06-20 | End: 2025-06-20

## 2025-06-20 RX ORDER — PREDNISONE 20 MG/1
60 TABLET ORAL
Status: COMPLETED | OUTPATIENT
Start: 2025-06-20 | End: 2025-06-20

## 2025-06-20 RX ADMIN — KETOROLAC TROMETHAMINE 15 MG: 30 INJECTION, SOLUTION INTRAMUSCULAR; INTRAVENOUS at 03:06

## 2025-06-20 RX ADMIN — FLUCONAZOLE 150 MG: 150 TABLET ORAL at 06:06

## 2025-06-20 RX ADMIN — PREDNISONE 60 MG: 20 TABLET ORAL at 06:06

## 2025-06-20 NOTE — FIRST PROVIDER EVALUATION
" Emergency Department TeleTriage Encounter Note      CHIEF COMPLAINT    Chief Complaint   Patient presents with    Headache     Sent down from clinic for HA and blurred vision. Pt CRP was elevated. Pt also reports currently taking abx and now has yeast infection.        VITAL SIGNS   Initial Vitals [06/20/25 1226]   BP Pulse Resp Temp SpO2   (!) 150/90 82 20 98.1 °F (36.7 °C) 99 %      MAP       --            ALLERGIES    Review of patient's allergies indicates:  No Known Allergies    PROVIDER TRIAGE NOTE  Verbal consent for the teletriage evaluation was given by the patient at the start of the evaluation.  All efforts will be made to maintain patient's privacy during the evaluation.      This is a teletriage evaluation of a 39 y.o. female presenting to the ED with c/o HA for 2 weeks; seen in the ED on 6/18/2025 with a negative w/u.  Sent by Neurology for an "US" of her head. Limited physical exam via telehealth: The patient is awake, alert, answering questions appropriately and is not in respiratory distress.  As the Teletriage provider, I performed an initial assessment and ordered appropriate labs and imaging studies, if any, to facilitate the patient's care once placed in the ED. Once a room is available, care and a full evaluation will be completed by an alternate ED provider.  Any additional orders and the final disposition will be determined by that provider.  All imaging and labs will not be followed-up by the Teletriage Team, including myself.          ORDERS  Labs Reviewed   HEPATITIS C ANTIBODY   HEP C VIRUS HOLD SPECIMEN   HIV 1 / 2 ANTIBODY       ED Orders (720h ago, onward)      Start Ordered     Status Ordering Provider    06/20/25 1331 06/20/25 1330  POCT urine pregnancy  Once         Ordered AIDAN MIRANDA    06/20/25 1229 06/20/25 1228  Hepatitis C Antibody  STAT        Placed in "And" Linked Group    Ordered JOSÉ MIGUEL ARGUETA    06/20/25 1229 06/20/25 1228  HCV Virus Hold Specimen  STAT        Placed in " ""And" Linked Group    Ordered JOSÉ MIGUEL ARGUETA    06/20/25 1229 06/20/25 1228  HIV 1/2 Ag/Ab (4th Gen)  STAT         Ordered JOSÉ MIGUEL ARGUETA              Virtual Visit Note: The provider triage portion of this emergency department evaluation and documentation was performed via [employer], a HIPAA-compliant telemedicine application, in concert with a tele-presenter in the room. A face to face patient evaluation with one of my colleagues will occur once the patient is placed in an emergency department room.      DISCLAIMER: This note was prepared with [employer] voice recognition transcription software. Garbled syntax, mangled pronouns, and other bizarre constructions may be attributed to that software system.    "

## 2025-06-20 NOTE — ED PROVIDER NOTES
"Encounter Date: 2025       History     Chief Complaint   Patient presents with    Headache     Sent down from clinic for HA and blurred vision. Pt CRP was elevated. Pt also reports currently taking abx and now has yeast infection.      Patient is a 39-year-old female who presents the emergency department stating that she has been sent here for "x-ray of her head" secondary to tenderness to the right temple and blood work that showed inflammation.  Chart review reveals that she was seen On 2025 by Jerel Doherty at [facility].  She reported blurred vision which is described to sounds like an aura.  Her CBC and chemistry were found to be within normal limits with the exception of mild hypokalemia and an elevated alk phos.  ESR and CRP were both mildly elevated. Pt also reports secondarily a vaginal yeast infection, requests treatment.    The history is provided by the patient. No  was used.     Review of patient's allergies indicates:  No Known Allergies  Past Medical History:   Diagnosis Date    Diabetes mellitus     Hypertension     Thyroid disease      Past Surgical History:   Procedure Laterality Date     SECTION, CLASSIC      x1    SALPINGOOPHORECTOMY  2020    left ovary and tube    TUBAL LIGATION       Family History   Problem Relation Name Age of Onset    No Known Problems Mother      No Known Problems Father      No Known Problems Sister      No Known Problems Brother      No Known Problems Maternal Aunt      No Known Problems Maternal Uncle      No Known Problems Paternal Aunt      No Known Problems Paternal Uncle      No Known Problems Maternal Grandmother      No Known Problems Maternal Grandfather      No Known Problems Paternal Grandmother      No Known Problems Paternal Grandfather      No Known Problems Other       Social History[1]  Review of Systems   Eyes:  Positive for visual disturbance.   Genitourinary:         Vaginal itching   Neurological:  " Positive for headaches.       Physical Exam     Initial Vitals [06/20/25 1226]   BP Pulse Resp Temp SpO2   (!) 150/90 82 20 98.1 °F (36.7 °C) 99 %      MAP       --         Physical Exam    Nursing note and vitals reviewed.  Constitutional: She appears well-developed and well-nourished.   HENT:   Head: Normocephalic and atraumatic.       Right Ear: Hearing, tympanic membrane, external ear and ear canal normal.   Left Ear: Hearing, tympanic membrane, external ear and ear canal normal.   Nose: Nose normal. No mucosal edema or rhinorrhea. No epistaxis. Right sinus exhibits no maxillary sinus tenderness and no frontal sinus tenderness. Left sinus exhibits no maxillary sinus tenderness and no frontal sinus tenderness. Mouth/Throat: Uvula is midline, oropharynx is clear and moist and mucous membranes are normal. No oral lesions. Normal dentition.   Eyes: Conjunctivae and EOM are normal. Pupils are equal, round, and reactive to light. Right eye exhibits no discharge. Left eye exhibits no discharge.   Neck:   Normal range of motion.  Abdominal: She exhibits no distension.   Musculoskeletal:         General: Normal range of motion.      Cervical back: Normal range of motion.     Neurological: She is alert and oriented to person, place, and time. She has normal strength. No cranial nerve deficit or sensory deficit. She exhibits normal muscle tone. She displays a negative Romberg sign. Coordination and gait normal. GCS eye subscore is 4. GCS verbal subscore is 5. GCS motor subscore is 6.   Equal  strength bilaterally, equal bicep flexion and tricep extension strength, leg extension and flexion strength appropriate and equal, foot plantar- and dorsi-flexion equal and appropriate   Skin: Skin is dry. Capillary refill takes less than 2 seconds.         ED Course   Procedures  Labs Reviewed   HEPATITIS C ANTIBODY - Normal       Result Value    Hep C Ab Interp Negative     HIV 1 / 2 ANTIBODY - Normal    HIV 1/2 Ag/Ab Negative    "  HEP C VIRUS HOLD SPECIMEN    Extra Tube Hold for add-ons.            Imaging Results              CT Head Without Contrast (Final result)  Result time 06/20/25 18:22:18      Final result by Letitia Lynch MD (06/20/25 18:22:18)                   Impression:    IMPRESSION:  1.  No radiographic evidence of acute intracranial process. No significant interval change.    -Electronically Signed By: Letitia Lynch MD   -Electronically Signed On:  6/20/2025 6:22 PM      Report Ends               Narrative:    EXAM:CT HEAD WITHOUT CONTRAST    HISTORY: , , Headache, chronic, new features or increased frequency,    TECHNIQUE:  Noncontrast CT head is performed with contiguous 5 mm axial images. Study is performed within 24 hour of hospital presentation.  CT dose reduction protocol was used per ALARA and Image Gently principles.    COMPARISON: 5/2/2022.     FINDINGS:  No high or low density intraparenchymal lesion is seen. No intra or extra-axial fluid collection identified. The ventricles and sulci are normal in size and configuration without evidence of hydrocephalus, midline shift, or mass effect. The paranasal sinuses and mastoid air cells are clear. No suspicious osseous lesions seen.                                       Medications   ketorolac injection 15 mg (15 mg Intravenous Given 6/20/25 1551)   predniSONE tablet 60 mg (60 mg Oral Given 6/20/25 1858)   fluconazole tablet 150 mg (150 mg Oral Given 6/20/25 1858)     Medical Decision Making  Patient is a 39-year-old female who presents the emergency department stating that she has been sent here for "x-ray of her head" secondary to tenderness to the right temple and blood work that showed inflammation.  Chart review reveals that she was seen On June 18, 2025 by Jerel Doherty at [facility].  She reported blurred vision which is described to sounds like an aura.  Her CBC and chemistry were found to be within normal limits with the exception of mild hypokalemia and " an elevated alk phos.  ESR and CRP were both mildly elevated.    On physical exam the patient is afebrile nontoxic in no apparent distress she has a normal neurologic exam of the cranial nerves and extremities.  There is tenderness to the right temple.  There is no torturous venous formation visible.  No pulsation is noted.    Differential diagnosis includes giant cell arteritis, trigeminal neuralgia, tension headache, secondary headache    Problems Addressed:  Right-sided headache: acute illness or injury     Details: I discussed the case with Dr. Castro (neurology) who felt this was unlikely to represent GCA but felt steroids may be helpful.  She recommended a medrol dose pack or a taper, I used the dose and duration specified in UTD.  She also recommended prompt f/u with neurology-I ordered a referral.  We appreciate her consultation.    Amount and/or Complexity of Data Reviewed  Labs:  Decision-making details documented in ED Course.  Radiology: ordered. Decision-making details documented in ED Course.  Discussion of management or test interpretation with external provider(s): Vital signs at the time of disposition were:  /75 (BP Location: Left arm, Patient Position: Sitting)   Pulse 79   Temp 97.5 °F (36.4 °C) (Oral)   Resp 16   Wt 108.9 kg (240 lb)   LMP 06/16/2025 (Approximate)   SpO2 97%   Breastfeeding No   BMI 39.94 kg/m²       See AVS for additional recommendations. Medications listed herein were prescribed after reviewing the patient's allergies, medication list, history, most recent laboratories as available.  Referrals below were provided after reviewing the patient's previous medical providers. She understands she  should return for any worsening or changes in condition.  Prior to discharge the patient was asked if she  had any additional concerns or complaints and she declined. The patient was given an opportunity to ask questions and all were answered to her satisfaction.      Risk  Prescription drug management.  Diagnosis or treatment significantly limited by social determinants of health.               ED Course as of 06/20/25 2027 Fri Jun 20, 2025   1502 BP(!): 150/90 [VC]   1502 Temp: 98.1 °F (36.7 °C) [VC]   1502 Temp Source: Oral [VC]   1502 Pulse: 82 [VC]   1502 Resp: 20 [VC]   1502 SpO2: 99 % [VC]   1629 BP(!): 144/69 [VC]   1629 MAP (mmHg): 99 [VC]   1629 Temp: 98 °F (36.7 °C) [VC]   1629 Temp Source: Oral [VC]   1629 Pulse: 77 [VC]   1629 Resp: 16 [VC]   1629 SpO2: 97 % [VC]   1653 Hepatitis C Ab: Negative [VC]   1702 HIV 1/2 Ag/Ab: Negative [VC]   1828 CT Head Without Contrast  No radiographic evidence of acute intracranial process. No significant interval change. [VC]   1837 c contacted to facilitate neurology consultation over phone. [VC]   1903 BP: 131/75 [VC]   1903 Temp: 97.5 °F (36.4 °C) [VC]   1903 Temp Source: Oral [VC]   1903 Pulse: 83 [VC]   1903 Resp: 16 [VC]   1903 SpO2: 97 % [VC]      ED Course User Index  [VC] Oj Smith DNP                           Clinical Impression:  Final diagnoses:  [R51.9] Right-sided headache (Primary)          ED Disposition Condition    Discharge Stable          ED Prescriptions       Medication Sig Dispense Start Date End Date Auth. Provider    predniSONE (DELTASONE) 20 MG tablet Take 3 tablets (60 mg total) by mouth once daily. for 7 days 21 tablet 6/20/2025 6/27/2025 Oj Smith DNP          Follow-up Information       Follow up With Specialties Details Why Contact Info    Rheumatology and Neurology referral provided  Schedule an appointment as soon as possible for a visit   They will Call you to make an appointment          Launch MDCalc MDM  MDCalc MDM Module  Jun 20 2025 8:26 PM [Oj Smith]  Data:  - Discussed with external professional: Case discussed with Neurology. See MDM section and/or ED Course for additional details on the discussion.  - Test/documents/historian: 1 test ordered  1 test  reviewed (reviewed ESR/CRP from previous ED visit)  Problems: Concern for Potentially Disabling Neurologic Condition  Additional encounter diagnoses: Right-sided headache  Risk: RX: predniSONE tablet + 3 more (Rx drug management), CT Head WO contr (CT w/o IV contrast)           [1]   Social History  Tobacco Use    Smoking status: Never    Smokeless tobacco: Never   Vaping Use    Vaping status: Never Used   Substance Use Topics    Alcohol use: Yes     Alcohol/week: 1.0 standard drink of alcohol     Types: 1 Glasses of wine per week     Comment: social    Drug use: No        Oj Smith, DNP  06/20/25 2027

## 2025-06-20 NOTE — ED TRIAGE NOTES
Pt reports blurred vision for the past two days. She started with a headache approx two weeks ago. She was told to come to the ED for eval after a tele visit today. She denies any other s/s

## 2025-06-20 NOTE — PROGRESS NOTES
"New Patient   The patient location is: [place]  The chief complaint leading to consultation is: headache     Visit type: audiovisual    Face to Face time with patient: 35  60 minutes of total time spent on the encounter, which includes face to face time and non-face to face time preparing to see the patient (eg, review of tests), Obtaining and/or reviewing separately obtained history, Documenting clinical information in the electronic or other health record, Independently interpreting results (not separately reported) and communicating results to the patient/family/caregiver, or Care coordination (not separately reported).     Each patient to whom he or she provides medical services by telemedicine is:  (1) informed of the relationship between the physician and patient and the respective role of any other health care provider with respect to management of the patient; and (2) notified that he or she may decline to receive medical services by telemedicine and may withdraw from such care at any time.  SUBJECTIVE:  Patient ID: Jacinda Hwang   MRN: [id number]  Referred By: Dr. Levy Hair  Chief Complaint: No chief complaint on file.      History of Present Illness:   39 y.o. female with htn, grave's disease, obesity, DM2, presents to clinic alone for evaluation of headaches.   PMHx negative for kidney stones, asthma, GI Bleed/ gastric surgery, osteoporosis, CAD/ MI, CVA/ TIA, DM, TBI, cancer    The patient endorses a history of headaches that began 2 weeks ago. The patient notes that she was watching TV when the pain came on a random. She states that she tried Tylenol and Excedrin for 1 week to treat the pain with no relief. She saw her eye doctor and dentist during this time as well but their work up was unremarkable. The patient states that on 06/18/2025, she began experiencing visual symptoms with her headaches which prompted her to go to the ER. The patient states that early in the day she saw "fire" in her " vision that was yellow and purple in color and lasted for about 2-3 minutes. 1-2 hour later, the patient was driving and experienced blurred vision to the point that she could not see and had to pull over. She had a worsening headache at this time as well. The patient then visited the ER where her ESR and CRP were elevated. The patient received a toradol injection and was discharged home without further workup.     The patient feels pain primarily in her right temple with radiation into her right orbital region and into her right jaw and well as into her right sided parietal region. She also endorses right sided neck pain and stiffness. The pain is  aching and throbbing  in nature. The pain can last from 3 hours with medication (fioricet) to 24 hours in duration. Triggers include eating, the patient notes that her pain comes on when she chews her food, which has made her not want to eat. The pain is relieved by fioricet and made worse by excitement, she states. The patient states that the pain occurs most often in the morning. She rates her pain as 7/10 in intensity. The patient currently experiences 14/14 headache days per month with 14 being severe in the past two weeks. She denies and prior history of headaches.     The patient notes that since her ER visit, her headaches are less severe but still lingering and dull. She denies any additional visual symptoms since her ER visit. She denies any recent fever or increasing fatigue.    Associated symptoms (positives in bold):   photophobia, phonophobia, exercise intolerance  osmophobia   Nausea/vomiting  Visual symptoms: blurry vision- patient notes this happened one time while driving on 06/18/2025, double vision, spots, giron, distorted vision  Dizziness /Vertigo  Confusion  Impaired concentration    Neck pain- right sided    Ringing in the ears    Social History  Alcohol- socially   Smoke- socially   Recreational Drug Use- denies   Caffeine intake- 2 per day    Sleep - Sleeps well  Eye Exam up to date- Normal dilated eye exam   Plans for Pregnancy/ breastfeeding- denies     Family Hx of Migraines denies     ----    Current treatment  Fioricet as needed- dulls pain      Current Medications:  Current Medications[1]    Review of Systems - as per HPI, otherwise a balanced 10 systems review is negative.    OBJECTIVE:  Vitals:  LMP 06/16/2025 (Approximate)     Physical Exam: certain limitations due to video visit platform, able to perform the following with the patient's assistance:  Constitutional: she appears well-developed and well-nourished. she is well groomed. NAD  HENT:    Head: Normocephalic and atraumatic  Musculoskeletal: Normal range of motion.   Psychiatric: Normal mood and affect.  The patient notes pain with palpation to her right temple.      Neuro exam:    Mental status:  The patient is alert and oriented to person, place and time.  Language is intact and fluent  Remote and recent memory are intact  Normal attention and concentration  Mood is stable    Motor:  Normal muscle bulk and symmetry.  Tremor not apparent    Review of Data:   Notes from ER reviewed   Labs:  Admission on 06/18/2025, Discharged on 06/19/2025   Component Date Value Ref Range Status    POC Preg Test, Ur 06/18/2025 Negative  Negative Final     Acceptable 06/18/2025 Yes   Final    Sed Rate 06/18/2025 63 (H)  <=36 mm/hr Final    CRP 06/18/2025 14.3 (H)  <=8.2 mg/L Final    Albumin, POC 06/18/2025 3.5  3.3 - 5.5 g/dL Final    Alkaline Phosphatase, POC 06/18/2025 144 (H)  42 - 141 U/L Final    ALT (SGPT), POC 06/18/2025 21  10 - 47 U/L Final    AST (SGOT), POC 06/18/2025 21  11 - 38 U/L Final    POC BUN 06/18/2025 11  7 - 22 mg/dL Final    Calcium, POC 06/18/2025 9.4  8.0 - 10.3 mg/dL Final    POC Chloride 06/18/2025 106  98 - 108 mmol/L Final    POC Creatinine 06/18/2025 0.7  0.6 - 1.2 mg/dL Final    POC Glucose 06/18/2025 110  73 - 118 mg/dL Final    POC Potassium 06/18/2025 3.5  (L)  3.6 - 5.1 mmol/L Final    POC Sodium 06/18/2025 138  128 - 145 mmol/L Final    Bilirubin, POC 06/18/2025 0.5  0.2 - 1.6 mg/dL Final    POC TCO2 06/18/2025 28  18 - 33 mmol/L Final    Protein, POC 06/18/2025 7.4  6.4 - 8.1 g/dL Final     Imaging:  Results for orders placed or performed during the hospital encounter of 05/02/22   CT Head Without Contrast    Narrative    EXAMINATION:  CT OF THE HEAD WITHOUT    CLINICAL HISTORY:  Headache, sudden, severe;    TECHNIQUE:  5 mm unenhanced axial images were obtained from the skull base to the vertex.    COMPARISON:  None.    FINDINGS:  The ventricles, basal cisterns, and cortical sulci are within normal limits for patient's stated age. There is no acute intracranial hemorrhage, territorial infarct or mass effect, or midline shift. The visualized paranasal sinuses and mastoid air cells are clear.      Impression    No acute intracranial abnormality detected.      Electronically signed by: Allison Ramirez  Date:    05/02/2022  Time:    19:15     Note: I have independently reviewed any/all imaging/labs/tests and agree with the report (s) as documented.  Any discrepancies will be as noted/demarcated by free text.  ON, FNP-C 6/20/2025    ASSESSMENT:  1. Right-sided headache    2. Blurred vision    3. Headache        ----    PLAN:  - Discussed symptoms appear to be consistent with the following differentials: Migraine with aura, cervicogenic headache, temporal arteritis.   - Since patient is having right sided HA with jaw claudication, visual symptoms, and elevated ESR/CRP, will have patient go to the ER for prompt temporal US to rule out GCA and inititaion of steroid treatment if indicated. Will defer treatment to ER physician, mendy recs.   -Will have patient follow up with me on Monday to initiate treatment plan for migraine with aura/ cervicogenic headache is work up for GCA is neagtive.   -Patient agreeable to go to the [facility] ER with no further questions.       "    I have discussed realistic goals of care with patient at length as well as medication options, and need for lifestyle adjustment. I have explained that treatment will take time. We have agreed that the goal will be to reduce frequency/intensity/quantity of HA, not to be completely HA free. I have explained my non narcotic policy regarding headache treatment.    Discussed potential for teratogenicity with treatment, patient understands if her family planning status should change she will contact office immediately and we will safely adjust medications as needed.     Questions and concerns were sought and answered to the patient's stated verbal satisfaction.  The patient verbalizes understanding and agreement with the above stated treatment plan.     Visit today included increased complexity associated with the care of the episodic problem right sided headache, blurry vision addressed and managing the longitudinal care of the patient due to the serious and/or complex managed problem(s) .     CC: Jus Sellers, MD [facility], FNP-C  [facility]  [phone]    Dr. Cordoba was available during today's encounter.          [1]   Current Outpatient Medications:     acetaminophen (TYLENOL) 500 MG tablet, Take 1 tablet (500 mg total) by mouth every 6 (six) hours as needed for Pain., Disp: 30 tablet, Rfl: 0    atorvastatin (LIPITOR) 20 MG tablet, Take 1 tablet (20 mg total) by mouth every evening., Disp: 90 tablet, Rfl: 3    BD ULTRA-FINE MICRO PEN NEEDLE 32 gauge x 1/4" Ndle, Inject into the skin., Disp: , Rfl:     butalbital-acetaminophen-caffeine -40 mg (FIORICET, ESGIC) -40 mg per tablet, Take 1 tablet by mouth every 4 (four) hours as needed for Pain., Disp: 40 tablet, Rfl: 0    diphenhydrAMINE (BENADRYL) 25 mg capsule, Take 1 capsule (25 mg total) by mouth every 6 (six) hours as needed for Itching or Allergies (Take if headache does not resolve)., Disp: 20 capsule, Rfl: " 0    ferrous gluconate (FERGON) 324 MG tablet, Take 1 tablet (324 mg total) by mouth nightly., Disp: 90 tablet, Rfl: 3    fluticasone propionate (FLONASE) 50 mcg/actuation nasal spray, 2 sprays (100 mcg total) by Each Nostril route once daily., Disp: 15 g, Rfl: 0    hydrOXYzine HCL (ATARAX) 25 MG tablet, Take 1 tablet (25 mg total) by mouth 3 (three) times daily as needed for Anxiety., Disp: 60 tablet, Rfl: 1    ibuprofen (ADVIL,MOTRIN) 600 MG tablet, Take 1 tablet (600 mg total) by mouth every 6 (six) hours as needed for Pain (Take with food as needed for mild-to-moderate pain)., Disp: 20 tablet, Rfl: 0    losartan-hydrochlorothiazide 50-12.5 mg (HYZAAR) 50-12.5 mg per tablet, Take 1 tablet by mouth once daily., Disp: 90 tablet, Rfl: 1    methIMAzole (TAPAZOLE) 10 MG Tab, Take 1 tablet (10 mg total) by mouth once daily., Disp: 90 tablet, Rfl: 1    omeprazole (PRILOSEC) 20 MG capsule, Take 20 mg by mouth once daily., Disp: , Rfl:     propranoloL (INDERAL) 20 MG tablet, Take 1 tablet (20 mg total) by mouth 2 (two) times daily., Disp: 60 tablet, Rfl: 3

## 2025-06-21 NOTE — DISCHARGE INSTRUCTIONS
Steroids as ordered.  Return to the Emergency Department for any worsening, change in condition, or any emergent concerns.

## 2025-06-22 NOTE — PROGRESS NOTES
Established Patient   SUBJECTIVE:  Patient ID: Jacinda Hwang   Chief Complaint: No chief complaint on file.    History of Present Illness:  Jacinda Hwang is a 39 y.o. female who presents for follow-up of headaches via virtual visit.     The patient location is: [place]  The chief complaint leading to consultation is: headache     Visit type: audiovisual    Face to Face time with patient: 15  10 minutes of total time spent on the encounter, which includes face to face time and non-face to face time preparing to see the patient (eg, review of tests), Obtaining and/or reviewing separately obtained history, Documenting clinical information in the electronic or other health record, Independently interpreting results (not separately reported) and communicating results to the patient/family/caregiver, or Care coordination (not separately reported).     Each patient to whom he or she provides medical services by telemedicine is:  (1) informed of the relationship between the physician and patient and the respective role of any other health care provider with respect to management of the patient; and (2) notified that he or she may decline to receive medical services by telemedicine and may withdraw from such care at any time.      Recommendations made at last Office Visit on 06/20/2025:  - Discussed symptoms appear to be consistent with the following differentials: Migraine with aura, cervicogenic headache, temporal arteritis.   - Since patient is having right sided HA with jaw claudication, visual symptoms, and elevated ESR/CRP, will have patient go to the ER for prompt temporal US to rule out GCA and inititaion of steroid treatment if indicated. Will defer treatment to ER physician, mendy jarrett.   -Will have patient follow up with me on Monday to initiate treatment plan for migraine with aura/ cervicogenic headache is work up for GCA is neagtive.   -Patient agreeable to go to the [facility] ER with no further  "question    06/22/2025 - Interval History:  Since our last visit, the patient went to the ER for work-up of GCA. While the ER physician did not suspect GCA for this patient, Prednisone 60mg x 7 days was initiated. I appreciate his evaluation and recommendations. The patient states that since her ER visit, her headaches have been well-controlled. She denies any headache today. She states that she has been taking the steroids intermittently but not consistently due to a busy weekend.     History of Present Illness:   39 y.o. female with htn, grave's disease, obesity, DM2, presents to clinic alone for evaluation of headaches.   PMHx negative for kidney stones, asthma, GI Bleed/ gastric surgery, osteoporosis, CAD/ MI, CVA/ TIA, DM, TBI, cancer     The patient endorses a history of headaches that began 2 weeks ago. The patient notes that she was watching TV when the pain came on a random. She states that she tried Tylenol and Excedrin for 1 week to treat the pain with no relief. She saw her eye doctor and dentist during this time as well but their work up was unremarkable. The patient states that on 06/18/2025, she began experiencing visual symptoms with her headaches which prompted her to go to the ER. The patient states that early in the day she saw "fire" in her vision that was yellow and purple in color and lasted for about 2-3 minutes. 1-2 hour later, the patient was driving and experienced blurred vision to the point that she could not see and had to pull over. She had a worsening headache at this time as well. The patient then visited the ER where her ESR and CRP were elevated. The patient received a toradol injection and was discharged home without further workup.      The patient feels pain primarily in her right temple with radiation into her right orbital region and into her right jaw and well as into her right sided parietal region. She also endorses right sided neck pain and stiffness. The pain is  aching " and throbbing  in nature. The pain can last from 3 hours with medication (fioricet) to 24 hours in duration. Triggers include eating, the patient notes that her pain comes on when she chews her food, which has made her not want to eat. The pain is relieved by fioricet and made worse by excitement, she states. The patient states that the pain occurs most often in the morning. She rates her pain as 7/10 in intensity. The patient currently experiences 14/14 headache days per month with 14 being severe in the past two weeks. She denies and prior history of headaches.      The patient notes that since her ER visit, her headaches are less severe but still lingering and dull. She denies any additional visual symptoms since her ER visit. She denies any recent fever or increasing fatigue.     Associated symptoms (positives in bold):   photophobia, phonophobia, exercise intolerance  osmophobia   Nausea/vomiting  Visual symptoms: blurry vision- patient notes this happened one time while driving on 06/18/2025, double vision, spots, giron, distorted vision  Dizziness /Vertigo  Confusion  Impaired concentration    Neck pain- right sided    Ringing in the ears    Treatments tried and Response:   Fioricet as needed- dulls pain       Current Medications:  Current Medications[1]    Review of Systems - A review of 10+ systems was conducted with pertinent positive and negative findings documented in HPI with all other systems reviewed and negative.    PFSH: Past medical, family, and social history reviewed as documented in chart with pertinent positive medical, family, and social history detailed in HPI.    OBJECTIVE:  Vitals: LMP 06/16/2025 (Approximate)      Physical Exam:  Constitutional: she appears well-developed and well-nourished. she is well groomed. NAD.    Review of Data:   Notes from ED reviewed   Labs:  Admission on 06/20/2025, Discharged on 06/20/2025   Component Date Value Ref Range Status    Hep C Ab Interp 06/20/2025  Negative  Negative Final    Extra Tube 06/20/2025 Hold for add-ons.   Final    HIV 1/2 Ag/Ab 06/20/2025 Negative  Negative Final   Admission on 06/18/2025, Discharged on 06/19/2025   Component Date Value Ref Range Status    POC Preg Test, Ur 06/18/2025 Negative  Negative Final     Acceptable 06/18/2025 Yes   Final    Sed Rate 06/18/2025 63 (H)  <=36 mm/hr Final    CRP 06/18/2025 14.3 (H)  <=8.2 mg/L Final    Albumin, POC 06/18/2025 3.5  3.3 - 5.5 g/dL Final    Alkaline Phosphatase, POC 06/18/2025 144 (H)  42 - 141 U/L Final    ALT (SGPT), POC 06/18/2025 21  10 - 47 U/L Final    AST (SGOT), POC 06/18/2025 21  11 - 38 U/L Final    POC BUN 06/18/2025 11  7 - 22 mg/dL Final    Calcium, POC 06/18/2025 9.4  8.0 - 10.3 mg/dL Final    POC Chloride 06/18/2025 106  98 - 108 mmol/L Final    POC Creatinine 06/18/2025 0.7  0.6 - 1.2 mg/dL Final    POC Glucose 06/18/2025 110  73 - 118 mg/dL Final    POC Potassium 06/18/2025 3.5 (L)  3.6 - 5.1 mmol/L Final    POC Sodium 06/18/2025 138  128 - 145 mmol/L Final    Bilirubin, POC 06/18/2025 0.5  0.2 - 1.6 mg/dL Final    POC TCO2 06/18/2025 28  18 - 33 mmol/L Final    Protein, POC 06/18/2025 7.4  6.4 - 8.1 g/dL Final     Imaging:  Results for orders placed or performed during the hospital encounter of 06/20/25   CT Head Without Contrast    Narrative    EXAM:CT HEAD WITHOUT CONTRAST    HISTORY: , , Headache, chronic, new features or increased frequency,    TECHNIQUE:  Noncontrast CT head is performed with contiguous 5 mm axial images. Study is performed within 24 hour of hospital presentation.  CT dose reduction protocol was used per ALARA and Image Gently principles.    COMPARISON: 5/2/2022.     FINDINGS:  No high or low density intraparenchymal lesion is seen. No intra or extra-axial fluid collection identified. The ventricles and sulci are normal in size and configuration without evidence of hydrocephalus, midline shift, or mass effect. The paranasal sinuses and  mastoid air cells are clear. No suspicious osseous lesions seen.      Impression    IMPRESSION:  1.  No radiographic evidence of acute intracranial process. No significant interval change.    -Electronically Signed By: Letitia Lynch MD   -Electronically Signed On:  6/20/2025 6:22 PM      Report Ends     Note: I have independently reviewed any/all imaging/labs/tests and agree with the report (s) as documented.  Any discrepancies will be as noted/demarcated by free text.  ON, FNP-C 6/23/2025    ASSESSMENT:  1. Migraine without aura and without status migrainosus, not intractable            PLAN:  - Discussed symptoms appear to be consistent with migraine with aura, discussed treatment options and patient agreed with the following plan:  -After further workup in the ER and chart review, patient's diagnosis is more consistent with migraine with aura.   -Prevention: None indicated at this time. Patient with 2 weeks of constant headaches that have since subsided. Will have patient track her headaches for the next month to see if medication for prevention is truly indicated.   -As-needed treatment: START Maxalt 10mg as needed for migraine. Take at onset of migraine and can repeat in 2 hours if needed. Start with taking 1/2 tablet to assess tolerability of medication,    - Start tracking headaches via Migraine Buddy shayla on phone or with written headache diary   - RTC in 1 month      Future Considerations:  Ppx: elavil, tpx  As needed: change to sumatriptan, gepant   Orders Placed This Encounter    rizatriptan (MAXALT) 10 MG tablet       [unfilled]    Discussed potential for teratogenicity with treatment, patient understands if her family planning status should change she will contact office immediately and we will safely adjust medications as needed.     [unfilled]    Plan for patient to return to clinic in 1 month months for follow-up visit.     CC: Jus Sellers MD [facility], FNP-C  [facility]  [place]   [phone]    Dr. Cordoba was available during today's encounter.             [1]   Current Outpatient Medications:     atorvastatin (LIPITOR) 20 MG tablet, Take 1 tablet (20 mg total) by mouth every evening., Disp: 90 tablet, Rfl: 3    butalbital-acetaminophen-caffeine -40 mg (FIORICET, ESGIC) -40 mg per tablet, Take 1 tablet by mouth every 4 (four) hours as needed for Pain., Disp: 40 tablet, Rfl: 0    ferrous gluconate (FERGON) 324 MG tablet, Take 1 tablet (324 mg total) by mouth nightly., Disp: 90 tablet, Rfl: 3    fluticasone propionate (FLONASE) 50 mcg/actuation nasal spray, 2 sprays (100 mcg total) by Each Nostril route once daily., Disp: 15 g, Rfl: 0    losartan-hydrochlorothiazide 50-12.5 mg (HYZAAR) 50-12.5 mg per tablet, Take 1 tablet by mouth once daily., Disp: 90 tablet, Rfl: 1    methIMAzole (TAPAZOLE) 10 MG Tab, Take 1 tablet (10 mg total) by mouth once daily., Disp: 90 tablet, Rfl: 1    omeprazole (PRILOSEC) 20 MG capsule, Take 20 mg by mouth once daily., Disp: , Rfl:     predniSONE (DELTASONE) 20 MG tablet, Take 3 tablets (60 mg total) by mouth once daily. for 7 days, Disp: 21 tablet, Rfl: 0    rizatriptan (MAXALT) 10 MG tablet, 1 tab by mouth as needed for migraine. May repeat every 2 hours for max 3 tabs in 24 hours. Use no more than 10 days per month., Disp: 10 tablet, Rfl: 5

## 2025-06-23 ENCOUNTER — OFFICE VISIT (OUTPATIENT)
Dept: NEUROLOGY | Facility: CLINIC | Age: 40
End: 2025-06-23
Payer: COMMERCIAL

## 2025-06-23 DIAGNOSIS — G43.109 MIGRAINE WITH AURA AND WITHOUT STATUS MIGRAINOSUS, NOT INTRACTABLE: Primary | ICD-10-CM

## 2025-06-23 DIAGNOSIS — G43.009 MIGRAINE WITHOUT AURA AND WITHOUT STATUS MIGRAINOSUS, NOT INTRACTABLE: ICD-10-CM

## 2025-06-23 PROCEDURE — 1160F RVW MEDS BY RX/DR IN RCRD: CPT | Mod: CPTII,95,, | Performed by: NURSE PRACTITIONER

## 2025-06-23 PROCEDURE — 98006 SYNCH AUDIO-VIDEO EST MOD 30: CPT | Mod: 95,,, | Performed by: NURSE PRACTITIONER

## 2025-06-23 PROCEDURE — 3072F LOW RISK FOR RETINOPATHY: CPT | Mod: CPTII,95,, | Performed by: NURSE PRACTITIONER

## 2025-06-23 PROCEDURE — 1159F MED LIST DOCD IN RCRD: CPT | Mod: CPTII,95,, | Performed by: NURSE PRACTITIONER

## 2025-06-23 PROCEDURE — 3044F HG A1C LEVEL LT 7.0%: CPT | Mod: CPTII,95,, | Performed by: NURSE PRACTITIONER

## 2025-06-23 RX ORDER — RIZATRIPTAN BENZOATE 10 MG/1
TABLET ORAL
Qty: 10 TABLET | Refills: 5 | Status: SHIPPED | OUTPATIENT
Start: 2025-06-23